# Patient Record
Sex: MALE | Race: WHITE | Employment: FULL TIME | URBAN - METROPOLITAN AREA
[De-identification: names, ages, dates, MRNs, and addresses within clinical notes are randomized per-mention and may not be internally consistent; named-entity substitution may affect disease eponyms.]

---

## 2023-05-07 ENCOUNTER — HOSPITAL ENCOUNTER (EMERGENCY)
Facility: HOSPITAL | Age: 58
Discharge: HOME | End: 2023-05-07
Attending: EMERGENCY MEDICINE
Payer: COMMERCIAL

## 2023-05-07 VITALS
BODY MASS INDEX: 27.77 KG/M2 | TEMPERATURE: 97 F | SYSTOLIC BLOOD PRESSURE: 137 MMHG | HEART RATE: 75 BPM | WEIGHT: 205 LBS | OXYGEN SATURATION: 99 % | RESPIRATION RATE: 9 BRPM | DIASTOLIC BLOOD PRESSURE: 69 MMHG | HEIGHT: 72 IN

## 2023-05-07 DIAGNOSIS — E16.2 HYPOGLYCEMIA: Primary | ICD-10-CM

## 2023-05-07 LAB
ALBUMIN SERPL-MCNC: 3 G/DL (ref 3.4–5)
ALBUMIN/GLOB SERPL: 0.8 {RATIO} (ref 1–2)
ALP LIVER SERPL-CCNC: 190 U/L
ALT SERPL-CCNC: 32 U/L
ANION GAP SERPL CALC-SCNC: 3 MMOL/L (ref 0–18)
AST SERPL-CCNC: 25 U/L (ref 15–37)
BASOPHILS # BLD AUTO: 0.06 X10(3) UL (ref 0–0.2)
BASOPHILS NFR BLD AUTO: 0.8 %
BILIRUB SERPL-MCNC: 0.2 MG/DL (ref 0.1–2)
BUN BLD-MCNC: 9 MG/DL (ref 7–18)
CALCIUM BLD-MCNC: 9 MG/DL (ref 8.5–10.1)
CHLORIDE SERPL-SCNC: 104 MMOL/L (ref 98–112)
CO2 SERPL-SCNC: 28 MMOL/L (ref 21–32)
CREAT BLD-MCNC: 0.82 MG/DL
EOSINOPHIL # BLD AUTO: 0.37 X10(3) UL (ref 0–0.7)
EOSINOPHIL NFR BLD AUTO: 4.8 %
ERYTHROCYTE [DISTWIDTH] IN BLOOD BY AUTOMATED COUNT: 14.1 %
GFR SERPLBLD BASED ON 1.73 SQ M-ARVRAT: 103 ML/MIN/1.73M2 (ref 60–?)
GLOBULIN PLAS-MCNC: 3.9 G/DL (ref 2.8–4.4)
GLUCOSE BLD-MCNC: 151 MG/DL (ref 70–99)
GLUCOSE BLD-MCNC: 192 MG/DL (ref 70–99)
GLUCOSE BLD-MCNC: 242 MG/DL (ref 70–99)
HCT VFR BLD AUTO: 30.7 %
HGB BLD-MCNC: 9.7 G/DL
IMM GRANULOCYTES # BLD AUTO: 0.03 X10(3) UL (ref 0–1)
IMM GRANULOCYTES NFR BLD: 0.4 %
LYMPHOCYTES # BLD AUTO: 1.2 X10(3) UL (ref 1–4)
LYMPHOCYTES NFR BLD AUTO: 15.7 %
MCH RBC QN AUTO: 25 PG (ref 26–34)
MCHC RBC AUTO-ENTMCNC: 31.6 G/DL (ref 31–37)
MCV RBC AUTO: 79.1 FL
MONOCYTES # BLD AUTO: 0.41 X10(3) UL (ref 0.1–1)
MONOCYTES NFR BLD AUTO: 5.4 %
NEUTROPHILS # BLD AUTO: 5.56 X10 (3) UL (ref 1.5–7.7)
NEUTROPHILS # BLD AUTO: 5.56 X10(3) UL (ref 1.5–7.7)
NEUTROPHILS NFR BLD AUTO: 72.9 %
OSMOLALITY SERPL CALC.SUM OF ELEC: 284 MOSM/KG (ref 275–295)
PLATELET # BLD AUTO: 459 10(3)UL (ref 150–450)
POTASSIUM SERPL-SCNC: 3.4 MMOL/L (ref 3.5–5.1)
PROT SERPL-MCNC: 6.9 G/DL (ref 6.4–8.2)
RBC # BLD AUTO: 3.88 X10(6)UL
SODIUM SERPL-SCNC: 135 MMOL/L (ref 136–145)
WBC # BLD AUTO: 7.6 X10(3) UL (ref 4–11)

## 2023-05-07 PROCEDURE — 80053 COMPREHEN METABOLIC PANEL: CPT | Performed by: EMERGENCY MEDICINE

## 2023-05-07 PROCEDURE — 82962 GLUCOSE BLOOD TEST: CPT

## 2023-05-07 PROCEDURE — 99285 EMERGENCY DEPT VISIT HI MDM: CPT

## 2023-05-07 PROCEDURE — 36415 COLL VENOUS BLD VENIPUNCTURE: CPT

## 2023-05-07 PROCEDURE — 99284 EMERGENCY DEPT VISIT MOD MDM: CPT

## 2023-05-07 PROCEDURE — 93005 ELECTROCARDIOGRAM TRACING: CPT

## 2023-05-07 PROCEDURE — 93010 ELECTROCARDIOGRAM REPORT: CPT

## 2023-05-07 PROCEDURE — 85025 COMPLETE CBC W/AUTO DIFF WBC: CPT | Performed by: EMERGENCY MEDICINE

## 2023-05-07 RX ORDER — ONDANSETRON 4 MG/1
4 TABLET, ORALLY DISINTEGRATING ORAL EVERY 8 HOURS PRN
COMMUNITY

## 2023-05-07 RX ORDER — HYDROCODONE BITARTRATE AND ACETAMINOPHEN 5; 325 MG/1; MG/1
0.5 TABLET ORAL EVERY 6 HOURS PRN
COMMUNITY

## 2023-05-07 RX ORDER — ACETAMINOPHEN 325 MG/1
650 TABLET ORAL EVERY 4 HOURS PRN
COMMUNITY

## 2023-05-07 RX ORDER — INSULIN ASPART 100 [IU]/ML
15 INJECTION, SOLUTION INTRAVENOUS; SUBCUTANEOUS
COMMUNITY

## 2023-05-07 RX ORDER — ACETAMINOPHEN 500 MG
1000 TABLET ORAL ONCE
Status: COMPLETED | OUTPATIENT
Start: 2023-05-07 | End: 2023-05-07

## 2023-05-07 RX ORDER — SENNOSIDES 8.6 MG
17.2 TABLET ORAL 2 TIMES DAILY
COMMUNITY

## 2023-05-07 RX ORDER — ALBUTEROL SULFATE 2.5 MG/3ML
2.5 SOLUTION RESPIRATORY (INHALATION) EVERY 4 HOURS PRN
COMMUNITY

## 2023-05-07 RX ORDER — DOCUSATE SODIUM 100 MG/1
100 CAPSULE, LIQUID FILLED ORAL 2 TIMES DAILY
COMMUNITY

## 2023-05-07 RX ORDER — TAMSULOSIN HYDROCHLORIDE 0.4 MG/1
0.4 CAPSULE ORAL DAILY
COMMUNITY

## 2023-05-07 RX ORDER — LIDOCAINE 50 MG/G
1 PATCH TOPICAL EVERY 24 HOURS
COMMUNITY

## 2023-05-07 RX ORDER — ENOXAPARIN SODIUM 100 MG/ML
40 INJECTION SUBCUTANEOUS DAILY
COMMUNITY

## 2023-05-07 RX ORDER — CLOPIDOGREL BISULFATE 75 MG/1
75 TABLET ORAL DAILY
COMMUNITY

## 2023-05-07 RX ORDER — MELATONIN
3 NIGHTLY
COMMUNITY

## 2023-05-07 RX ORDER — MODAFINIL 100 MG/1
100 TABLET ORAL DAILY
COMMUNITY

## 2023-05-07 RX ORDER — CALCIUM CARBONATE 500 MG/1
2 TABLET, CHEWABLE ORAL 3 TIMES DAILY
COMMUNITY

## 2023-05-07 RX ORDER — BISACODYL 10 MG
10 SUPPOSITORY, RECTAL RECTAL DAILY
COMMUNITY

## 2023-05-07 RX ORDER — LEVETIRACETAM 1000 MG/1
1000 TABLET ORAL 2 TIMES DAILY
COMMUNITY

## 2023-05-07 RX ORDER — CYCLOBENZAPRINE HCL 10 MG
10 TABLET ORAL 3 TIMES DAILY PRN
COMMUNITY

## 2023-05-07 RX ORDER — FLUTICASONE PROPIONATE 50 MCG
1 SPRAY, SUSPENSION (ML) NASAL DAILY
COMMUNITY

## 2023-05-07 RX ORDER — GUAIFENESIN 600 MG/1
600 TABLET, EXTENDED RELEASE ORAL 2 TIMES DAILY
COMMUNITY

## 2023-05-07 NOTE — ED INITIAL ASSESSMENT (HPI)
Pt brought to ED by [facility] with c/o low blood sugar. Pt comes from [address] [place] [address], upon their arrival patient's blood glucose was 35 given an amp of glucose, blood sugar increased to 48 patient started on D10 glucose was 148 upon arrived to ED patient's glucose was 151. Pt will open his eyes to painful stimuli at his inner upper arm but then closes his eyes again.

## 2023-05-08 LAB
ATRIAL RATE: 109 BPM
P AXIS: 57 DEGREES
P-R INTERVAL: 148 MS
Q-T INTERVAL: 298 MS
QRS DURATION: 84 MS
QTC CALCULATION (BEZET): 401 MS
R AXIS: -22 DEGREES
T AXIS: 20 DEGREES
VENTRICULAR RATE: 109 BPM

## 2023-05-08 NOTE — ED QUICK NOTES
[place]  [place] ([facility]) sandwich and juice supplied to PT

## 2025-02-22 ENCOUNTER — HOSPITAL ENCOUNTER (INPATIENT)
Facility: HOSPITAL | Age: 60
LOS: 4 days | Discharge: NURSING, REHAB OR LONG-TERM CARE | End: 2025-02-26
Attending: STUDENT IN AN ORGANIZED HEALTH CARE EDUCATION/TRAINING PROGRAM | Admitting: STUDENT IN AN ORGANIZED HEALTH CARE EDUCATION/TRAINING PROGRAM
Payer: MEDICARE

## 2025-02-22 ENCOUNTER — APPOINTMENT (OUTPATIENT)
Dept: CT IMAGING | Facility: HOSPITAL | Age: 60
End: 2025-02-22
Attending: STUDENT IN AN ORGANIZED HEALTH CARE EDUCATION/TRAINING PROGRAM
Payer: MEDICARE

## 2025-02-22 ENCOUNTER — APPOINTMENT (OUTPATIENT)
Dept: GENERAL RADIOLOGY | Facility: HOSPITAL | Age: 60
End: 2025-02-22
Attending: STUDENT IN AN ORGANIZED HEALTH CARE EDUCATION/TRAINING PROGRAM
Payer: MEDICARE

## 2025-02-22 DIAGNOSIS — J69.0 ASPIRATION PNEUMONIA OF LEFT LUNG, UNSPECIFIED ASPIRATION PNEUMONIA TYPE, UNSPECIFIED PART OF LUNG (HCC): ICD-10-CM

## 2025-02-22 DIAGNOSIS — G40.909 SEIZURE DISORDER (HCC): Primary | ICD-10-CM

## 2025-02-22 LAB
ALBUMIN SERPL-MCNC: 3.8 G/DL (ref 3.2–4.8)
ALBUMIN/GLOB SERPL: 1.4 {RATIO} (ref 1–2)
ALP LIVER SERPL-CCNC: 164 U/L
ALT SERPL-CCNC: 29 U/L
ANION GAP SERPL CALC-SCNC: 10 MMOL/L (ref 0–18)
AST SERPL-CCNC: 17 U/L (ref ?–34)
ATRIAL RATE: 98 BPM
BASE EXCESS BLDA CALC-SCNC: 5.7 MMOL/L (ref ?–2)
BASOPHILS # BLD AUTO: 0.16 X10(3) UL (ref 0–0.2)
BASOPHILS NFR BLD AUTO: 1.4 %
BILIRUB SERPL-MCNC: 0.3 MG/DL (ref 0.3–1.2)
BILIRUB UR QL STRIP.AUTO: NEGATIVE
BODY TEMPERATURE: 98.6 F
BUN BLD-MCNC: 10 MG/DL (ref 9–23)
CALCIUM BLD-MCNC: 8.9 MG/DL (ref 8.7–10.6)
CHLORIDE SERPL-SCNC: 101 MMOL/L (ref 98–112)
CLARITY UR REFRACT.AUTO: CLEAR
CO2 SERPL-SCNC: 30 MMOL/L (ref 21–32)
COHGB MFR BLD: 1.4 % SAT (ref 0–3)
COLOR UR AUTO: COLORLESS
CREAT BLD-MCNC: 0.83 MG/DL
EGFRCR SERPLBLD CKD-EPI 2021: 101 ML/MIN/1.73M2 (ref 60–?)
EOSINOPHIL # BLD AUTO: 1.31 X10(3) UL (ref 0–0.7)
EOSINOPHIL NFR BLD AUTO: 11.6 %
ERYTHROCYTE [DISTWIDTH] IN BLOOD BY AUTOMATED COUNT: 13.6 %
GLOBULIN PLAS-MCNC: 2.7 G/DL (ref 2–3.5)
GLUCOSE BLD-MCNC: 103 MG/DL (ref 70–99)
GLUCOSE BLD-MCNC: 138 MG/DL (ref 70–99)
GLUCOSE BLD-MCNC: 208 MG/DL (ref 70–99)
GLUCOSE BLD-MCNC: 244 MG/DL (ref 70–99)
GLUCOSE UR STRIP.AUTO-MCNC: >1000 MG/DL
HCO3 BLDA-SCNC: 29.4 MEQ/L (ref 21–27)
HCT VFR BLD AUTO: 36.1 %
HGB BLD-MCNC: 10.9 G/DL
HGB BLD-MCNC: 11.8 G/DL
IMM GRANULOCYTES # BLD AUTO: 0.03 X10(3) UL (ref 0–1)
IMM GRANULOCYTES NFR BLD: 0.3 %
KETONES UR STRIP.AUTO-MCNC: 20 MG/DL
L/M: 3 L/MIN
LACTATE SERPL-SCNC: 1.6 MMOL/L (ref 0.5–2)
LACTATE SERPL-SCNC: 5 MMOL/L (ref 0.5–2)
LEUKOCYTE ESTERASE UR QL STRIP.AUTO: 250
LYMPHOCYTES # BLD AUTO: 2.24 X10(3) UL (ref 1–4)
LYMPHOCYTES NFR BLD AUTO: 19.8 %
MCH RBC QN AUTO: 27.2 PG (ref 26–34)
MCHC RBC AUTO-ENTMCNC: 32.7 G/DL (ref 31–37)
MCV RBC AUTO: 83.2 FL
METHGB MFR BLD: 0.8 % SAT (ref 0.4–1.5)
MONOCYTES # BLD AUTO: 0.38 X10(3) UL (ref 0.1–1)
MONOCYTES NFR BLD AUTO: 3.4 %
NEUTROPHILS # BLD AUTO: 7.2 X10 (3) UL (ref 1.5–7.7)
NEUTROPHILS # BLD AUTO: 7.2 X10(3) UL (ref 1.5–7.7)
NEUTROPHILS NFR BLD AUTO: 63.5 %
NITRITE UR QL STRIP.AUTO: NEGATIVE
OSMOLALITY SERPL CALC.SUM OF ELEC: 297 MOSM/KG (ref 275–295)
OXYHGB MFR BLDA: 97.4 % (ref 92–100)
P AXIS: 58 DEGREES
P-R INTERVAL: 188 MS
PCO2 BLDA: 48 MM HG (ref 35–45)
PH BLDA: 7.42 [PH] (ref 7.35–7.45)
PH UR STRIP.AUTO: 6 [PH] (ref 5–8)
PLATELET # BLD AUTO: 382 10(3)UL (ref 150–450)
PO2 BLDA: 137 MM HG (ref 80–100)
POTASSIUM SERPL-SCNC: 3 MMOL/L (ref 3.5–5.1)
PROT SERPL-MCNC: 6.5 G/DL (ref 5.7–8.2)
PROT UR STRIP.AUTO-MCNC: 30 MG/DL
Q-T INTERVAL: 378 MS
QRS DURATION: 82 MS
QTC CALCULATION (BEZET): 482 MS
R AXIS: -30 DEGREES
RBC # BLD AUTO: 4.34 X10(6)UL
RBC UR QL AUTO: NEGATIVE
SODIUM SERPL-SCNC: 141 MMOL/L (ref 136–145)
SP GR UR STRIP.AUTO: 1.01 (ref 1–1.03)
T AXIS: 28 DEGREES
UROBILINOGEN UR STRIP.AUTO-MCNC: NORMAL MG/DL
VENTRICULAR RATE: 98 BPM
WBC # BLD AUTO: 11.3 X10(3) UL (ref 4–11)

## 2025-02-22 PROCEDURE — 71045 X-RAY EXAM CHEST 1 VIEW: CPT | Performed by: STUDENT IN AN ORGANIZED HEALTH CARE EDUCATION/TRAINING PROGRAM

## 2025-02-22 PROCEDURE — 70450 CT HEAD/BRAIN W/O DYE: CPT | Performed by: STUDENT IN AN ORGANIZED HEALTH CARE EDUCATION/TRAINING PROGRAM

## 2025-02-22 RX ORDER — DEXTROSE MONOHYDRATE 25 G/50ML
50 INJECTION, SOLUTION INTRAVENOUS
Status: DISCONTINUED | OUTPATIENT
Start: 2025-02-22 | End: 2025-02-26

## 2025-02-22 RX ORDER — LORAZEPAM 2 MG/ML
0.5 CONCENTRATE ORAL EVERY 2 HOUR PRN
COMMUNITY

## 2025-02-22 RX ORDER — ACETAMINOPHEN 500 MG
500 TABLET ORAL EVERY 4 HOURS PRN
Status: DISCONTINUED | OUTPATIENT
Start: 2025-02-22 | End: 2025-02-23

## 2025-02-22 RX ORDER — LEVETIRACETAM 500 MG/5ML
1000 INJECTION, SOLUTION, CONCENTRATE INTRAVENOUS EVERY 12 HOURS
Status: DISCONTINUED | OUTPATIENT
Start: 2025-02-22 | End: 2025-02-24

## 2025-02-22 RX ORDER — ONDANSETRON 2 MG/ML
4 INJECTION INTRAMUSCULAR; INTRAVENOUS EVERY 6 HOURS PRN
Status: DISCONTINUED | OUTPATIENT
Start: 2025-02-22 | End: 2025-02-26

## 2025-02-22 RX ORDER — METRONIDAZOLE 500 MG/100ML
500 INJECTION, SOLUTION INTRAVENOUS ONCE
Status: COMPLETED | OUTPATIENT
Start: 2025-02-22 | End: 2025-02-22

## 2025-02-22 RX ORDER — LORAZEPAM 2 MG/ML
1 INJECTION INTRAMUSCULAR EVERY 6 HOURS PRN
Status: DISCONTINUED | OUTPATIENT
Start: 2025-02-22 | End: 2025-02-26

## 2025-02-22 RX ORDER — METRONIDAZOLE 500 MG/100ML
500 INJECTION, SOLUTION INTRAVENOUS EVERY 12 HOURS
Status: DISCONTINUED | OUTPATIENT
Start: 2025-02-23 | End: 2025-02-23

## 2025-02-22 RX ORDER — SODIUM PHOSPHATE, DIBASIC AND SODIUM PHOSPHATE, MONOBASIC 7; 19 G/230ML; G/230ML
1 ENEMA RECTAL ONCE AS NEEDED
Status: DISCONTINUED | OUTPATIENT
Start: 2025-02-22 | End: 2025-02-26

## 2025-02-22 RX ORDER — COLLAGENASE SANTYL 250 [ARB'U]/G
OINTMENT TOPICAL DAILY
COMMUNITY

## 2025-02-22 RX ORDER — LACOSAMIDE 100 MG/1
100 TABLET ORAL EVERY 12 HOURS
COMMUNITY
End: 2025-02-26

## 2025-02-22 RX ORDER — LORAZEPAM 2 MG/ML
2 INJECTION INTRAMUSCULAR ONCE
Status: COMPLETED | OUTPATIENT
Start: 2025-02-22 | End: 2025-02-22

## 2025-02-22 RX ORDER — LACOSAMIDE 100 MG/1
100 TABLET ORAL EVERY 12 HOURS
Status: DISCONTINUED | OUTPATIENT
Start: 2025-02-22 | End: 2025-02-24

## 2025-02-22 RX ORDER — ENOXAPARIN SODIUM 100 MG/ML
40 INJECTION SUBCUTANEOUS DAILY
Status: DISCONTINUED | OUTPATIENT
Start: 2025-02-22 | End: 2025-02-26

## 2025-02-22 RX ORDER — NICOTINE POLACRILEX 4 MG
LOZENGE BUCCAL
COMMUNITY

## 2025-02-22 RX ORDER — SODIUM CHLORIDE 9 MG/ML
INJECTION, SOLUTION INTRAVENOUS CONTINUOUS
Status: DISCONTINUED | OUTPATIENT
Start: 2025-02-22 | End: 2025-02-23

## 2025-02-22 RX ORDER — NICOTINE POLACRILEX 4 MG
30 LOZENGE BUCCAL
Status: DISCONTINUED | OUTPATIENT
Start: 2025-02-22 | End: 2025-02-26

## 2025-02-22 RX ORDER — POLYETHYLENE GLYCOL 3350 17 G/17G
17 POWDER, FOR SOLUTION ORAL DAILY PRN
Status: DISCONTINUED | OUTPATIENT
Start: 2025-02-22 | End: 2025-02-26

## 2025-02-22 RX ORDER — OMEPRAZOLE 20 MG/1
20 CAPSULE, DELAYED RELEASE ORAL EVERY MORNING
COMMUNITY

## 2025-02-22 RX ORDER — PROCHLORPERAZINE EDISYLATE 5 MG/ML
5 INJECTION INTRAMUSCULAR; INTRAVENOUS EVERY 8 HOURS PRN
Status: DISCONTINUED | OUTPATIENT
Start: 2025-02-22 | End: 2025-02-26

## 2025-02-22 RX ORDER — NICOTINE POLACRILEX 4 MG
15 LOZENGE BUCCAL
Status: DISCONTINUED | OUTPATIENT
Start: 2025-02-22 | End: 2025-02-26

## 2025-02-22 RX ORDER — INSULIN DEGLUDEC 100 U/ML
12 INJECTION, SOLUTION SUBCUTANEOUS DAILY
Status: DISCONTINUED | OUTPATIENT
Start: 2025-02-23 | End: 2025-02-26

## 2025-02-22 RX ORDER — SENNOSIDES 8.6 MG
17.2 TABLET ORAL NIGHTLY PRN
Status: DISCONTINUED | OUTPATIENT
Start: 2025-02-22 | End: 2025-02-26

## 2025-02-22 RX ORDER — GLUCAGON INJECTION, SOLUTION 1 MG/.2ML
1 INJECTION, SOLUTION SUBCUTANEOUS ONCE AS NEEDED
COMMUNITY

## 2025-02-22 RX ORDER — LORAZEPAM 2 MG/ML
2 CONCENTRATE ORAL
COMMUNITY

## 2025-02-22 RX ORDER — LEVETIRACETAM 500 MG/5ML
1000 INJECTION, SOLUTION, CONCENTRATE INTRAVENOUS ONCE
Status: COMPLETED | OUTPATIENT
Start: 2025-02-22 | End: 2025-02-22

## 2025-02-22 RX ORDER — INSULIN GLARGINE 100 [IU]/ML
15 INJECTION, SOLUTION SUBCUTANEOUS DAILY
COMMUNITY
End: 2025-02-26

## 2025-02-22 RX ORDER — BISACODYL 10 MG
10 SUPPOSITORY, RECTAL RECTAL
Status: DISCONTINUED | OUTPATIENT
Start: 2025-02-22 | End: 2025-02-26

## 2025-02-22 RX ORDER — POTASSIUM CHLORIDE 1500 MG/1
40 TABLET, EXTENDED RELEASE ORAL ONCE
Status: COMPLETED | OUTPATIENT
Start: 2025-02-22 | End: 2025-02-22

## 2025-02-22 NOTE — ED PROVIDER NOTES
Patient Seen in: [facility] Emergency Department      History     Chief Complaint   Patient presents with    Seizure Disorder     Stated Complaint: actively seizing while coming in to ER    Subjective:   HPI  Patient is a 59-year-old male presented to the emergency room actively seizing from nursing home.  Patient reportedly may have had a seizure this morning and was found by new staff members stating that he did not appear to be himself.  EMS tells me when they arrived patient appeared postictal to them.  As they are wheeling the patient into his ER room they noticed he was having active seizure therefore they pushed 2 mg of IV Versed.    Per the patient's nursing home chart review it appears he has a history of a stroke with residual left-sided weakness, he is a type I diabetic and he also appears to be on 500 mg of Keppra twice a day.        Objective:     Past Medical History:    Anemia    Cancer (HCC)    Cardiomyopathy (HCC)    Congestive heart disease (HCC)    Esophageal reflux    Essential hypertension    Hyperlipidemia    Neuropathy    Renal disorder    Type I (juvenile type) diabetes mellitus without mention of complication, not stated as uncontrolled              Past Surgical History:   Procedure Laterality Date    Below knee leg amputation Right     Cataract      Other surgical history      tendon 2006    Other surgical history      (L) hallux amputation 2006    Other surgical history                  Social History     Socioeconomic History    Marital status: Single   Tobacco Use    Smoking status: Never   Vaping Use    Vaping status: Never Used   Substance and Sexual Activity    Alcohol use: Yes     Comment: social    Drug use: Not Currently     Social Drivers of Health     Food Insecurity: No Food Insecurity (2/22/2025)    NCSS - Food Insecurity     Worried About Running Out of Food in the Last Year: No     Ran Out of Food in the Last Year: No   Transportation Needs: No Transportation Needs  (2/22/2025)    NCSS - Transportation     Lack of Transportation: No   Housing Stability: Not At Risk (2/22/2025)    NCSS - Housing/Utilities     Has Housing: Yes     Worried About Losing Housing: No     Unable to Get Utilities: No                  Physical Exam     ED Triage Vitals   BP 02/22/25 1032 115/69   Pulse 02/22/25 1032 108   Resp 02/22/25 1032 (!) 32   Temp 02/22/25 1452 97.7 °F (36.5 °C)   Temp src 02/22/25 1452 Oral   SpO2 02/22/25 1032 93 %   O2 Device 02/22/25 1032 Nasal cannula       Current Vitals:   Vital Signs  BP: 129/60  Pulse: (!) 36  Resp: 12  Temp: 98.4 °F (36.9 °C)  Temp src: Oral  MAP (mmHg): 77    Oxygen Therapy  SpO2: (!) 84 %  O2 Device: None (Room air)  O2 Flow Rate (L/min): 3 L/min  Pulse Oximetry Type: Intermittent  Oximetry Probe Site Changed: No        Physical Exam  Vitals and nursing note reviewed.   Constitutional:       Comments: Eyes closed, when open they are deviated to the right   HENT:      Right Ear: Tympanic membrane normal.      Left Ear: Tympanic membrane normal.      Nose: Nose normal.   Eyes:      Pupils: Pupils are equal, round, and reactive to light.      Comments: Eyes shut when eyelids pulled back eyes are deviated to the right   Cardiovascular:      Rate and Rhythm: Normal rate and regular rhythm.      Pulses: Normal pulses.      Heart sounds: Normal heart sounds.   Pulmonary:      Effort: Pulmonary effort is normal.      Comments: Diminished  Musculoskeletal:      Comments: Left upper and lower extremity weakness chronic   Right BKA   Neurological:      Mental Status: He is disoriented.      Motor: Weakness present.             ED Course     Labs Reviewed   CBC WITH DIFFERENTIAL WITH PLATELET - Abnormal; Notable for the following components:       Result Value    WBC 11.3 (*)     HGB 11.8 (*)     HCT 36.1 (*)     Eosinophil Absolute 1.31 (*)     All other components within normal limits   COMP METABOLIC PANEL (14) - Abnormal; Notable for the following components:     Glucose 208 (*)     Potassium 3.0 (*)     Calculated Osmolality 297 (*)     Alkaline Phosphatase 164 (*)     All other components within normal limits   URINALYSIS WITH CULTURE REFLEX - Abnormal; Notable for the following components:    Urine Color Colorless (*)     Glucose Urine >1000 (*)     Ketones Urine 20 (*)     Protein Urine 30 (*)     Leukocyte Esterase Urine 250 (*)     WBC Urine 11-20 (*)     Bacteria Urine Rare (*)     All other components within normal limits   LACTIC ACID, PLASMA - Abnormal; Notable for the following components:    Lactic Acid 5.0 (*)     All other components within normal limits   ARTERIAL BLOOD GAS - Abnormal; Notable for the following components:    ABG pCO2 48 (*)     ABG pO2 137 (*)     ABG HCO3 29.4 (*)     ABG Base Excess 5.7 (*)     Total Hemoglobin 10.9 (*)     All other components within normal limits   COMP METABOLIC PANEL (14) - Abnormal; Notable for the following components:    Glucose 129 (*)     Sodium 146 (*)     BUN <5 (*)     Creatinine 0.66 (*)     Alkaline Phosphatase 148 (*)     All other components within normal limits   CBC WITH DIFFERENTIAL WITH PLATELET - Abnormal; Notable for the following components:    WBC 12.1 (*)     RBC 3.90 (*)     HGB 10.7 (*)     HCT 33.2 (*)     Neutrophil Absolute Prelim 7.89 (*)     Neutrophil Absolute 7.89 (*)     Eosinophil Absolute 1.36 (*)     All other components within normal limits   POCT GLUCOSE - Abnormal; Notable for the following components:    POC Glucose 244 (*)     All other components within normal limits   POCT GLUCOSE - Abnormal; Notable for the following components:    POC Glucose 103 (*)     All other components within normal limits   POCT GLUCOSE - Abnormal; Notable for the following components:    POC Glucose 138 (*)     All other components within normal limits   POCT GLUCOSE - Abnormal; Notable for the following components:    POC Glucose 235 (*)     All other components within normal limits   POCT GLUCOSE -  Abnormal; Notable for the following components:    POC Glucose 154 (*)     All other components within normal limits   URINE CULTURE, ROUTINE - Abnormal; Notable for the following components:    Urine Culture 10,000 - 50,000 CFU/ML Pseudomonas aeruginosa (*)     All other components within normal limits   LACTIC ACID REFLEX POST POSTIVE - Normal   SCAN SLIDE   SCAN SLIDE   RAINBOW DRAW BLUE   BLOOD CULTURE   BLOOD CULTURE     EKG    Rate, intervals and axes as noted on EKG Report.  Rate: 98  Rhythm: Sinus Rhythm  Reading:  nonspecific ST changes           CT BRAIN OR HEAD (CPT=70450)    Result Date: 2/22/2025  CONCLUSION:  1. No acute intracranial hemorrhage. 2. Ventricular enlargement is out of proportion to cortical enlargement suggesting normal pressure hydrocephalus, correlate clinically. 3. Bilateral low-attenuation within the white matter suggests encephalomalacia from prior insults.  A superimposed acute infarct cannot be entirely excluded.  An MRI of the brain can be performed for further evaluation as clinically indicated. 4. If patient has prior studies of the brain, these would be helpful for further evaluation and to assess stability of the above findings.    LOCATION:  [facility]   Dictated by (CST): Gretchen Horn MD on 2/22/2025 at 11:46 AM     Finalized by (CST): Gretchen Horn MD on 2/22/2025 at 11:50 AM       XR CHEST AP PORTABLE  (CPT=71045)    Result Date: 2/22/2025  CONCLUSION:  Minimal left basilar opacity may be due to a developing consolidation or aspiration.   LOCATION:  [facility]      Dictated by (CST): Juan Jorgensen MD on 2/22/2025 at 11:46 AM     Finalized by (CST): Juan Jorgensen MD on 2/22/2025 at 11:47 AM                   [facility]        Medical Decision Making  The differential includes the following  Status epilepticus, infectious etiology leading to sepsis sepsis, aspiration pneumonia, metabolic derangement    Pertinent comorbidities include  As detailed above    Pertinent social history  includes  As detailed above    Labs  Lactate is 5, wbc 11,   Potassium 3.0     /    Imaging studies  I reviewed the images and my independent interpreation after review is no floored pulm edema. Additionaly, I reviewd the radiology report that states the following left lower opacity    External data reviewed  Prior labs     Discussion of management with external providers  Gen neuro Dr. Selby who recommends CNICU admission  Neuro ICU Dr. Barba recommends loading with two additional grams of 2 grams of keppra and floor as pt is returning back to baseline    ER course  On arrival to the ER patient was actively apparently seizing when EMS brought him and therefore he was given 2 of IV Versed.  On my initial exam he has his eyes closed but when open by myself I see's are still deviated towards the left and patient is responsive therefore I find him with additional 2 mg of IV Ativan and a gram of Keppra to load him.  Patient underwent CT head scan rule out intracranial hemorrhage or other acute pathology which is ultimately negative.      Patient's lactic elevated at 5 which could be related to recent seizures but also potentially as a result of sepsis.  Given additional concern for potential aspiration pneumonia blood cultures obtained and patient treated with empiric antibiotics to cover for sepsis.  There was a delay in antibiotic administration as patient has allergies and there is no collateral information.  Our ER pharmacist was able to provide recommendations on appropriate antibiotics.     Patient became more awake and alert and responsive.  His brother and sister-in-law arrived.  They provide us with additional information stating he had had 2 seizures while at the nursing home.  They also informed us that the patient is actually blind.  Lastly they said that he is a DNR patient.  Given patient's return to baseline he was admitted to the floor and given additional Keppra as advised by neurointensivist.  He  had no further seizures while in the ER.    A total of 35 minutes of critical care time (exclusive of billable procedures) was administered to manage the patient's critical lab values and neurologic instability due to his concerns for status epilepticus.  This involved direct patient intervention, complex decision making, and/or extensive discussions with the patient, family, and clinical staff.      Disposition and Plan     Clinical Impression:  1. Seizure disorder (HCC)    2. Aspiration pneumonia of left lung, unspecified aspiration pneumonia type, unspecified part of lung (HCC)         Disposition:  Admit  2/22/2025  1:14 pm    Follow-up:  No follow-up provider specified.        Medications Prescribed:  Current Discharge Medication List              Supplementary Documentation:     [facility]   part of [facility]      Sepsis Reassessment Note    /60 (BP Location: Left arm)   Pulse (!) 36   Temp 98.4 °F (36.9 °C) (Oral)   Resp 12   Ht 182.9 cm (6')   Wt 93 kg   SpO2 (!) 84%   BMI 27.81 kg/m²      I completed the sepsis reassessment at 1:15 pm    Cardiac:  Regularity: Regular  Rate: Normal  Heart Sounds: S1,S2    Lungs:   Right: Clear  Left: Diminished    Peripheral Pulses:  Radial: Right 2+ or Left 2+      Capillary Refill:  <3 Secs    Skin:  Temp/Moisture: Warm and Dry  Color: Normal      Chiqui Francisco MD  2/22/2025  3:12 PM       Hospital Problems       Present on Admission  Date Reviewed: 6/30/2014            ICD-10-CM Noted POA    * (Principal) Seizure disorder (HCC) G40.909 2/22/2025 Unknown    Aspiration pneumonia of left lung, unspecified aspiration pneumonia type, unspecified part of lung (HCC) J69.0 2/22/2025 Unknown

## 2025-02-22 NOTE — ED QUICK NOTES
RN to CT and back with patient. Pt remained stable, coughing. Prior to going to CT, RN straight cath patient for urine. Pt diaper saturated. Approx 400cc urine output in bag. RN contacted Rebeca in the lab requesting phlebotomy draw for blood cultures.

## 2025-02-22 NOTE — ED QUICK NOTES
Orders for admission, patient is aware of plan and ready to go upstairs. Any questions, please call ED DAVID Moss at extension [phone].     Patient Covid vaccination status: Unvaccinated     COVID Test Ordered in ED: None    COVID Suspicion at Admission: N/A    Running Infusions:    sodium chloride 2,790 mL (02/22/25 1045)        Mental Status/LOC at time of transport: A&Ox3    Other pertinent information:   CIWA score: N/A   NIH score:  N/A

## 2025-02-22 NOTE — PLAN OF CARE
NURSING ADMISSION NOTE      Patient admitted via Cart  Oriented to room.  Safety precautions initiated.  Bed in low position.  Call light in reach.  Admission assessment completed with the patient to the best of his understanding. Patient is A & o x2-3, answers most questions appropriately and follows commands. Incontinent with primofit in place, bedbound at baseline.

## 2025-02-22 NOTE — ED QUICK NOTES
Pt soiled the bed. Pt cleaned and placed in a new brief. This RN and ED PCT changed the sheets on the bed.

## 2025-02-22 NOTE — ED QUICK NOTES
Nataliya doing Med rec on patient. Meds listed in Epic at this time are not accurate. Pt is normally seen at [facility].

## 2025-02-22 NOTE — ED QUICK NOTES
Spoke with RN at nursing home who states pt is normally AAOX3-4. Pt can usually communicate with staff. Pt is not ambulatory, pt is blind. Pt is a DNR, unable to find documentation.

## 2025-02-22 NOTE — H&P
[facility] Hospitalist H&P       CC:   Chief Complaint   Patient presents with    Seizure Disorder        PCP: Julianne Zambrano MD    History of Present Illness: Patient is a 59 year old male with PMH sig for hypertension, hyperlipidemia, CHF, type 1 diabetes, status post right BKA history of seizures who was brought from nursing home for evaluation of seizures.  Patient had 2 seizures at the nursing home and was confused, when he was brought to the hospital, he had another seizure in the ER, Versed was given, patient was somewhat confused.  He was unable to provide any meaningful history on presentation when I evaluated him in the ER.  In the ER, vital signs significant for heart rate 102, respirations 32, blood pressure 115/69, satting 87% on 3 L of nasal cannula.  Patient's lactic acid was noted to be 5 labs significant for mild leukocytosis.,  Hypokalemia.  UA was abnormal.  Chest x-ray obtained.  He is admitted for further workup and management.      PMH  Past Medical History:    Anemia    Cancer (HCC)    Cardiomyopathy (HCC)    Congestive heart disease (HCC)    Esophageal reflux    Essential hypertension    Hyperlipidemia    Neuropathy    Renal disorder    Type I (juvenile type) diabetes mellitus without mention of complication, not stated as uncontrolled        PSH  Past Surgical History:   Procedure Laterality Date    Below knee leg amputation Right     Cataract      Other surgical history      tendon 2006    Other surgical history      (L) hallux amputation 2006    Other surgical history          ALL:  Allergies[1]     Home Medications:  Medications Taking[2]      Soc Hx  Social History     Tobacco Use    Smoking status: Never    Smokeless tobacco: Not on file   Substance Use Topics    Alcohol use: Yes     Comment: social        Fam Hx  Family History   Problem Relation Age of Onset    Cancer Neg     Diabetes Neg     Heart Disorder Neg     Hypertension Neg     Lipids Neg        Review of Systems  Comprehensive  ROS reviewed and negative except for what's stated above.     OBJECTIVE:  /84   Pulse 101   Temp 97.7 °F (36.5 °C) (Oral)   Resp 19   Wt 205 lb 0.4 oz (93 kg)   SpO2 100%   BMI 27.81 kg/m²   General:  Drowsy, confused, restless   Head:  Normocephalic, without obvious abnormality, atraumatic.   Eyes:  Sclera anicteric, No conjunctival pallor, EOMs intact.    Nose: Nares normal. Septum midline. Mucosa normal. No drainage.       Neck: Supple, symmetrical, trachea midline,   Lungs:   Clear to auscultation bilaterally. Normal effort   Chest wall:  No tenderness or deformity.   Heart:  Regular rate and rhythm, S1, S2 normal,    Abdomen:   Soft, non-tender. Bowel sounds normal.   Non distended   Extremities: R BKA,    Skin: Skin color, texture, turgor normal. No rashes or lesions.    Neurologic: Confused, restless, unable to participate in exam     Diagnostic Data:    CBC/Chem  Recent Labs   Lab 02/22/25  1104   WBC 11.3*   HGB 11.8*   MCV 83.2   .0       Recent Labs   Lab 02/22/25  1104      K 3.0*      CO2 30.0   BUN 10   CREATSERUM 0.83   *   CA 8.9       Recent Labs   Lab 02/22/25  1104   ALT 29   AST 17   ALB 3.8       No results for input(s): \"TROP\" in the last 168 hours.    CXR: image personally reviewed     Radiology: CT BRAIN OR HEAD (CPT=70450)    Result Date: 2/22/2025  CONCLUSION:  1. No acute intracranial hemorrhage. 2. Ventricular enlargement is out of proportion to cortical enlargement suggesting normal pressure hydrocephalus, correlate clinically. 3. Bilateral low-attenuation within the white matter suggests encephalomalacia from prior insults.  A superimposed acute infarct cannot be entirely excluded.  An MRI of the brain can be performed for further evaluation as clinically indicated. 4. If patient has prior studies of the brain, these would be helpful for further evaluation and to assess stability of the above findings.    LOCATION:  [facility]   Dictated by (CST): Justina  MD Gretchen on 2/22/2025 at 11:46 AM     Finalized by (CST): Gretchen Horn MD on 2/22/2025 at 11:50 AM       XR CHEST AP PORTABLE  (CPT=71045)    Result Date: 2/22/2025  CONCLUSION:  Minimal left basilar opacity may be due to a developing consolidation or aspiration.   LOCATION:  [facility]      Dictated by (CST): Juan Jorgensen MD on 2/22/2025 at 11:46 AM     Finalized by (CST): Juan Jorgensen MD on 2/22/2025 at 11:47 AM          ASSESSMENT / PLAN:     Patient is a 59 year old male with PMH sig for hypertension, hyperlipidemia, CHF, type 1 diabetes, status post right BKA history of seizures who was brought from nursing home for evaluation of seizures.     Breakthrough seizures  History of seizure disorder  Rule out infection  - Loaded with Keppra, continue Keppra 1000 mg twice daily  - Continue home lacosamide  -Add as needed Ativan for breakthrough seizures  - Telemetry  - Neurology consult, appreciate recommendations  -Treat underlying infection if any    Leukocytosis  Abnormal UA  Possible aspiration pneumonitis in the setting of seizures   lactic acidosis  -Elevated lactate of 5 in the setting of seizures, repeat normal  -Blood cultures x 2 pending  - UA grossly abnormal, await cultures  - Empirically covered with IV ceftriaxone and IV Flagyl    Type 1 diabetes with hyperglycemia  - Uses pump at home however pump is not present  - Basal bolus regimen, Accu-Cheks, ISS  - Will start with 12 units Tresiba daily with 4 units NovoLog 3 times daily with meals-adjust as needed  - Diabetes educator consult    FN:  - IVF: 0.9 NS  - Diet: Diabetic    DVT Prophy: SCDs, Lovenox  Atrophy: PT/OT  Lines: PIV    Dispo: admit    Outpatient records or previous hospital records reviewed.     Further recommendations pending patient's clinical course.  [facility] hospitalist to continue to follow patient while in house    Patient and/or patient's family given opportunity to ask questions and note understanding and agreeing with therapeutic  plan as outlined    Thank You,  DO Amy Orosco Hospitalist  Answering Service number: [phone]         [1]   Allergies  Allergen Reactions    Morphine UNKNOWN    Other UNKNOWN     Opioids listed on radha face sheet    Piperacillin UNKNOWN    Zosyn UNKNOWN   [2]   Outpatient Medications Marked as Taking for the 2/22/25 encounter (Hospital Encounter)   Medication Sig Dispense Refill    glucose (GLUTOSE 15) 40% Oral Gel Give 1 kit by mouth every 1 hours as needed for hypoglycemia      glucagon (GVOKE HYPOPEN 1-PACK) 1 MG/0.2ML Subcutaneous injection Inject 0.2 mL (1 mg total) into the skin once as needed for Low blood glucose.      insulin glargine (LANTUS SOLOSTAR) 100 UNIT/ML Subcutaneous Solution Pen-injector Inject 15 Units into the skin daily.      insulin aspart 100 Units/mL Subcutaneous Solution Pen-injector Inject 8 Units into the skin in the morning, at noon, and at bedtime.      insulin aspart 100 Units/mL Subcutaneous Solution Pen-injector Inject as per sliding scale: if  150-200 = 2 units  201-250 = 4 units  251-300 = 6 units  301- 350 = 8 units  Subcutaneously before meals and at bedtime for diabetes greater than 350 give 10 unites and call provider      omeprazole 20 MG Oral Capsule Delayed Release Take 1 capsule (20 mg total) by mouth every morning.      Silver (OPTIFOAM GENTLE AG DRESSING EX) Apply topically. Apply to left heel topically as needed for skin condition. Cleanse area with NS, pat dry, apply santyl and cover with foam dressing.      collagenase (SANTYL) 250 UNIT/GM External Ointment Apply topically daily. Apply to left heel topically as needed for skin condition. Cleanse area with NS, pat dry, apply santyl and cover with foam dressing.      lacosamide (VIMPAT) 100 MG Oral Tab Take 1 tablet (100 mg total) by mouth every 12 (twelve) hours.      acetaminophen 500 MG Oral Tab Take 2 tablets (1,000 mg total) by mouth in the morning, at noon, and at bedtime. For pain       levetiracetam 1000 MG Oral Tab Take 1 tablet (1,000 mg total) by mouth every 12 (twelve) hours.

## 2025-02-22 NOTE — ED INITIAL ASSESSMENT (HPI)
Pt to ED for lethargy after seizure. Pt does not have hx seizures. Upon entering ER, pt began seizing. Medics gave 2mg Versed. Dr. Francisco at bedside. Pt continues to seize.

## 2025-02-23 LAB
ALBUMIN SERPL-MCNC: 3.6 G/DL (ref 3.2–4.8)
ALBUMIN/GLOB SERPL: 1.6 {RATIO} (ref 1–2)
ALP LIVER SERPL-CCNC: 148 U/L
ALT SERPL-CCNC: 18 U/L
ANION GAP SERPL CALC-SCNC: 7 MMOL/L (ref 0–18)
AST SERPL-CCNC: 13 U/L (ref ?–34)
BASOPHILS # BLD AUTO: 0.14 X10(3) UL (ref 0–0.2)
BASOPHILS NFR BLD AUTO: 1.2 %
BILIRUB SERPL-MCNC: 0.3 MG/DL (ref 0.3–1.2)
BUN BLD-MCNC: <5 MG/DL (ref 9–23)
CALCIUM BLD-MCNC: 8.8 MG/DL (ref 8.7–10.6)
CHLORIDE SERPL-SCNC: 109 MMOL/L (ref 98–112)
CO2 SERPL-SCNC: 30 MMOL/L (ref 21–32)
CREAT BLD-MCNC: 0.66 MG/DL
EGFRCR SERPLBLD CKD-EPI 2021: 109 ML/MIN/1.73M2 (ref 60–?)
EOSINOPHIL # BLD AUTO: 1.36 X10(3) UL (ref 0–0.7)
EOSINOPHIL NFR BLD AUTO: 11.2 %
ERYTHROCYTE [DISTWIDTH] IN BLOOD BY AUTOMATED COUNT: 13.7 %
GLOBULIN PLAS-MCNC: 2.2 G/DL (ref 2–3.5)
GLUCOSE BLD-MCNC: 129 MG/DL (ref 70–99)
GLUCOSE BLD-MCNC: 154 MG/DL (ref 70–99)
GLUCOSE BLD-MCNC: 231 MG/DL (ref 70–99)
GLUCOSE BLD-MCNC: 235 MG/DL (ref 70–99)
GLUCOSE BLD-MCNC: 81 MG/DL (ref 70–99)
HCT VFR BLD AUTO: 33.2 %
HGB BLD-MCNC: 10.7 G/DL
IMM GRANULOCYTES # BLD AUTO: 0.04 X10(3) UL (ref 0–1)
IMM GRANULOCYTES NFR BLD: 0.3 %
LYMPHOCYTES # BLD AUTO: 2.16 X10(3) UL (ref 1–4)
LYMPHOCYTES NFR BLD AUTO: 17.8 %
MCH RBC QN AUTO: 27.4 PG (ref 26–34)
MCHC RBC AUTO-ENTMCNC: 32.2 G/DL (ref 31–37)
MCV RBC AUTO: 85.1 FL
MONOCYTES # BLD AUTO: 0.54 X10(3) UL (ref 0.1–1)
MONOCYTES NFR BLD AUTO: 4.5 %
NEUTROPHILS # BLD AUTO: 7.89 X10 (3) UL (ref 1.5–7.7)
NEUTROPHILS # BLD AUTO: 7.89 X10(3) UL (ref 1.5–7.7)
NEUTROPHILS NFR BLD AUTO: 65 %
PLATELET # BLD AUTO: 255 10(3)UL (ref 150–450)
POTASSIUM SERPL-SCNC: 3.6 MMOL/L (ref 3.5–5.1)
PROT SERPL-MCNC: 5.8 G/DL (ref 5.7–8.2)
RBC # BLD AUTO: 3.9 X10(6)UL
SODIUM SERPL-SCNC: 146 MMOL/L (ref 136–145)
WBC # BLD AUTO: 12.1 X10(3) UL (ref 4–11)

## 2025-02-23 PROCEDURE — 95720 EEG PHY/QHP EA INCR W/VEEG: CPT | Performed by: OTHER

## 2025-02-23 PROCEDURE — 99223 1ST HOSP IP/OBS HIGH 75: CPT | Performed by: OTHER

## 2025-02-23 RX ORDER — ACETAMINOPHEN 500 MG
500 TABLET ORAL EVERY 4 HOURS PRN
Status: DISCONTINUED | OUTPATIENT
Start: 2025-02-23 | End: 2025-02-26

## 2025-02-23 NOTE — PLAN OF CARE
Patient A&O x 2. Confusion. Occasional, occasional agitation. Room air. NSR on tele.   Lovenox. Seizure precautions in place. Electrolyte protocol. 1:1 feed. Incontinent x 2. Primo  Carb controlled diet. QID accu check. PIV to the right upper arm and right hand   0.9 at 100 to the right upper arm. IV abx. Not ambulatory.   Consults: Hosp/Neuro   Plan of care updated. Continuously rounded on         Problem: Patient/Family Goals  Goal: Patient/Family Long Term Goal  Description: Patient's Long Term Goal: Discharge     Interventions:  - Follow plan of care   - See additional Care Plan goals for specific interventions  Outcome: Progressing  Goal: Patient/Family Short Term Goal  Description: Patient's Short Term Goal:   2/22 NOC: Control seizure     Interventions:   - Anti convulsant meds, seizure precautions in place   - See additional Care Plan goals for specific interventions  Outcome: Progressing

## 2025-02-23 NOTE — CONSULTS
Infectious Disease Initial Consultation      Date of admission: 2/22/2025 10:29 AM     Date of service: 02/23/25 3:18 PM    Consult requested by: Adi Dawson DO    Reason for consult: Pseudomonal UTI    Chief complaint: Pseudomonal UTI    History of present illness: Janes Heart is a 59 year old male with history of hypertension, hyperlipidemia, CAD, congestive heart failure, who presented to the hospital with multiple seizures and postictal confusion.    Following the emergency room, he had another seizure and was given Versed at that point.  Patient has a history of stroke with residual left-sided hemiparesis.    In the emergency room, the patient was afebrile, hemodynamically stable.  Labs revealed unremarkable BMP.  Alk phos was slightly elevated at 164.  CBC showed mild leukocytosis white count of 11.3 with eosinophilia.  UA showed pyuria with 11-20 white blood cells.  Urine culture currently growing Pseudomonas aeruginosa.  2 sets of blood cultures obtained, remain negative to date.  CT of the brain did not show any acute changes or findings.  Normal pressure hydrocephalus noted.  Bilateral low-attenuation within the minor suggesting encephalomalacia.  MRI of the brain was ordered and is currently pending.  Case discussed with ID and the patient was switched from ceftriaxone and Flagyl to cefepime given Pseudomonas in his urine.    Review of systems:  All other components of the review of systems are negative, except those described in the history of present illness.     Past Medical History:    Anemia    Cancer (HCC)    Cardiomyopathy (HCC)    Congestive heart disease (HCC)    Esophageal reflux    Essential hypertension    Hyperlipidemia    Neuropathy    Renal disorder    Type I (juvenile type) diabetes mellitus without mention of complication, not stated as uncontrolled     Past Surgical History:   Procedure Laterality Date    Below knee leg amputation Right     Cataract      Other surgical  history      tendon 2006    Other surgical history      (L) hallux amputation 2006    Other surgical history       Social History     Socioeconomic History    Marital status: Single   Tobacco Use    Smoking status: Never   Vaping Use    Vaping status: Never Used   Substance and Sexual Activity    Alcohol use: Yes     Comment: social    Drug use: Not Currently     Social Drivers of Health     Food Insecurity: No Food Insecurity (2/22/2025)    NCSS - Food Insecurity     Worried About Running Out of Food in the Last Year: No     Ran Out of Food in the Last Year: No   Transportation Needs: No Transportation Needs (2/22/2025)    NCSS - Transportation     Lack of Transportation: No   Housing Stability: Not At Risk (2/22/2025)    NCSS - Housing/Utilities     Has Housing: Yes     Worried About Losing Housing: No     Unable to Get Utilities: No     Family History   Problem Relation Age of Onset    Cancer Neg     Diabetes Neg     Heart Disorder Neg     Hypertension Neg     Lipids Neg      Reviewed, see above    Medications:    acetaminophen    cefepime    enoxaparin    melatonin    ondansetron    prochlorperazine    polyethylene glycol (PEG 3350)    sennosides    bisacodyl    fleet enema    glucose **OR** glucose **OR** glucose-vitamin C **OR** dextrose **OR** glucose **OR** glucose **OR** glucose-vitamin C    insulin aspart    insulin degludec    insulin aspart    lacosamide    levETIRAcetam    LORazepam     Allergies:  Allergies[1]    Physical Exam:  Vitals:    02/23/25 1327   BP:    Pulse: (!) 36   Resp: 12   Temp:      Vitals signs and nursing note reviewed.   Constitutional:       Appearance: Normal appearance.   HENT:      Head: Normocephalic and atraumatic.      Mouth: Mucous membranes are moist.   Neck:      Musculoskeletal: Neck supple.   Cardiovascular:      Rate and Rhythm: Normal rate.   Pulmonary:      Effort: Pulmonary effort is normal. No respiratory distress.   Abdominal:      General: Abdomen is flat. There  is no distension.      Palpations: Abdomen is soft. There is no mass.      Tenderness: There is no tenderness. There is no guarding or rebound.      Hernia: No hernia is present.   Musculoskeletal:      Right lower leg: No edema.      Left lower leg: No edema.   Skin:     General: Skin is warm and dry.   Neurological:      Mental Status: Alert and oriented to person, place, and time.     Laboratory data:  I have independently reviewed all lab results; including old microbiological results.  Lab Results   Component Value Date    WBC 12.1 02/23/2025    HGB 10.7 02/23/2025    HCT 33.2 02/23/2025    .0 02/23/2025    CREATSERUM 0.66 02/23/2025    BUN <5 02/23/2025     02/23/2025    K 3.6 02/23/2025     02/23/2025    CO2 30.0 02/23/2025     02/23/2025    CA 8.8 02/23/2025    ALB 3.6 02/23/2025    ALKPHO 148 02/23/2025    BILT 0.3 02/23/2025    TP 5.8 02/23/2025    AST 13 02/23/2025    ALT 18 02/23/2025        Recent Labs   Lab 02/23/25  0955   RBC 3.90*   HGB 10.7*   HCT 33.2*   MCV 85.1   MCH 27.4   MCHC 32.2   RDW 13.7   NEPRELIM 7.89*   WBC 12.1*   .0       Microbiology data:  Hospital Encounter on 02/22/25   1. Urine Culture, Routine     Status: Abnormal (Preliminary result)    Collection Time: 02/22/25 11:04 AM    Specimen: Urine, clean catch   Result Value Ref Range    Urine Culture 10,000 - 50,000 CFU/ML Pseudomonas aeruginosa (A) N/A         Radiology:  I have reviewed all imaging data available independently.   Chest x-ray on 2/22:  Left basilar atelectasis/consolidation, question of aspiration    CT brain on 12/22/2025: No acute findings    Impression:  Janes Heart is a 59 year old male with     Pseudomonal UTI in this patient, presenting here with multiple seizures  Neurology currently following for the seizures  The patient is not having any  symptoms; however, given his presentation, will elect to treat him  Currently on IV cefepime after discussion with ID  Question  of aspiration pneumonia as seen on the chest x-ray  The patient is otherwise not hypoxic  Currently on cefepime  Leukocytosis  Reactive in the setting of all the above  Will continue to trend  Status epilepticus  Management as per neurology  Reported allergy to piperacillin-tazobactam  Unknown reaction    Recommendations:     Continue IV cefepime pending susceptibilities  Management of seizure disorder as per primary team  Continue to monitor daily labs for antibiotic toxicity  Further recommendations will depend on the above work-up and clinical progress     The plan of care was discussed with the primary hospital team, Adi Dawson DO     Recommendations were also discussed with the patient; all questions were answered.     Thank you for this consultation. Please don't hesitate to call the ID team for questions or any acute changes in patient's clinical condition.    Please note that this report has been produced using speech recognition software and may contain errors related to that system including, but not limited to, errors in grammar, punctuation, and spelling, as well as words and phrases that possibly may have been recognized inappropriately.  If there are any questions or concerns, contact the dictating provider for clarification.    The  [employer] Cures Act makes medical notes like these available to patients in the interest of transparency. Please be advised this is a medical document. Medical documents are intended to carry relevant information, facts as evident, and the clinical opinion of the practitioner. The medical note is intended as peer to peer communication and may appear blunt or direct. It is written in medical language and may contain abbreviations or verbiage that are unfamiliar.     Cong Lion MD  [facility]. Tel: [phone]. Fax: [phone].     Janes Heart : 1965 MRN: [id number] [facility]: [id number]          [1]   Allergies  Allergen Reactions     Morphine UNKNOWN    Other UNKNOWN     Opioids listed on radha face sheet    Piperacillin UNKNOWN    Zosyn UNKNOWN

## 2025-02-23 NOTE — PROGRESS NOTES
Patient removed EEG and refused to have it replaced. Stated to charge nurse \"I am not putting that back on!\" EEG not replaced on the patient

## 2025-02-23 NOTE — PROGRESS NOTES
Received pt A&Ox2.  on RA. NSR on tele. Lovenox for VTE prophylaxis. Flagyl. Seizure precautions. Tolerating diet QID accuchecks. 1:1 feed. Voids via primofit. Bedbound. R leg amputee. Tylenol Pain. Plan of care continues, no further needs at this time.

## 2025-02-23 NOTE — CONSULTS
[facility]   NEUROLOGY   CONSULT NOTE    Admission date: 2/22/2025  Reason for Consult: Status epilepticus.  Chief Complaint:   Chief Complaint   Patient presents with    Seizure Disorder   ________________________________________________________________    History     History of Presenting Illness  59 year old male with multiple medical problems including hypertension, hyperlipidemia, coronary artery disease, congestive heart failure, brought to emergency department after he was noted to have active seizures at nursing home and remained confused.  Patient was noticed to be more confused by the EMS team patient.  While the patient was being wheeled to the emergency department he had another seizure and was given Versed.  Patient apparently has residual left-sided weakness due to prior stroke.  Patient when seen today was awake, responsive, answering questions, complaining of generalized bodyaches, denying any prior history of seizures and stating that he was told that he has tumor in his head at [facility].  I did not find evidence of that on review of external medical records.    History obtained from patient chart review.    Past Medical History:    Anemia    Cancer (HCC)    Cardiomyopathy (HCC)    Congestive heart disease (HCC)    Esophageal reflux    Essential hypertension    Hyperlipidemia    Neuropathy    Renal disorder    Type I (juvenile type) diabetes mellitus without mention of complication, not stated as uncontrolled     Past Surgical History:   Procedure Laterality Date    Below knee leg amputation Right     Cataract      Other surgical history      tendon 2006    Other surgical history      (L) hallux amputation 2006    Other surgical history       Social History     Socioeconomic History    Marital status: Single   Tobacco Use    Smoking status: Never   Vaping Use    Vaping status: Never Used   Substance and Sexual Activity    Alcohol use: Yes     Comment: social    Drug use: Not Currently      Social Drivers of Health     Food Insecurity: No Food Insecurity (2/22/2025)    NCSS - Food Insecurity     Worried About Running Out of Food in the Last Year: No     Ran Out of Food in the Last Year: No   Transportation Needs: No Transportation Needs (2/22/2025)    NCSS - Transportation     Lack of Transportation: No   Housing Stability: Not At Risk (2/22/2025)    NCSS - Housing/Utilities     Has Housing: Yes     Worried About Losing Housing: No     Unable to Get Utilities: No     Family History   Problem Relation Age of Onset    Cancer Neg     Diabetes Neg     Heart Disorder Neg     Hypertension Neg     Lipids Neg      Allergies Allergies[1]    Home Meds  Current Outpatient Medications   Medication Instructions    acetaminophen (TYLENOL EXTRA STRENGTH) 1,000 mg, 3 times daily    collagenase (SANTYL) 250 UNIT/GM External Ointment Daily    glucose (GLUTOSE 15) 40% Oral Gel Give 1 kit by mouth every 1 hours as needed for hypoglycemia    Gvoke HypoPen 1-Pack 1 mg, Once as needed    insulin aspart (NOVOLOG) 8 Units, 3 times daily    insulin aspart 100 Units/mL Subcutaneous Solution Pen-injector Inject as per sliding scale: if  150-200 = 2 units  201-250 = 4 units  251-300 = 6 units  301- 350 = 8 units  Subcutaneously before meals and at bedtime for diabetes greater than 350 give 10 unites and call provider    lacosamide (VIMPAT) 100 mg, Every 12 hours    Lantus SoloStar 15 Units, Daily    levetiracetam (KEPPRA) 1,000 mg, Oral, Every 12 hours    lidocaine 4 % External Patch 1 patch, Transdermal, Daily, On for 12 hours and off for 12 hours    LORazepam Intensol 2 mg/mL Oral Conc 0.5 mL, Every 2 hour PRN    LORazepam Intensol 2 mg/mL Oral Conc 2 mL, Every 15 min PRN    omeprazole (PRILOSEC) 20 mg, Every morning    Silver (OPTIFOAM GENTLE AG DRESSING EX) Apply topically. Apply to left heel topically as needed for skin condition. Cleanse area with NS, pat dry, apply santyl and cover with foam dressing.     Scheduled  Meds:   cefepime  2 g Intravenous Q8H    enoxaparin  40 mg Subcutaneous Daily    insulin aspart  2-10 Units Subcutaneous TID AC and HS    insulin degludec  12 Units Subcutaneous Daily    insulin aspart  4 Units Subcutaneous TID CC    lacosamide  100 mg Oral q12h    levETIRAcetam  1,000 mg Intravenous Q12H     Continuous Infusions:   sodium chloride 100 mL/hr at 02/23/25 0135     PRN Meds:  acetaminophen    melatonin    ondansetron    prochlorperazine    polyethylene glycol (PEG 3350)    sennosides    bisacodyl    fleet enema    glucose **OR** glucose **OR** glucose-vitamin C **OR** dextrose **OR** glucose **OR** glucose **OR** glucose-vitamin C    LORazepam    OBJECTIVE   VITAL SIGNS:   Temp:  [97.7 °F (36.5 °C)-99 °F (37.2 °C)] 98.4 °F (36.9 °C)  Pulse:  [] 36  Resp:  [12-24] 12  BP: (129-155)/(60-84) 129/60  SpO2:  [84 %-100 %] 84 %    PHYSICAL EXAM:    NEUROLOGIC:    Mental Status:  A&O x 3, Follows simple commands, no obvious aphasia, mild dysarthria.  Attention, short-term memory seems impaired  Cranial nerves: PERRL.  Visual fields full.  EOMI.  Face symmetric with normal movement bilaterally.  Hearing grossly intact. Tongue midline with normal movements.   Motor: Residual left-sided weakness with contractures noted.  Normal right upper extremity strength noted.  Right lower extremity BKA noted.    Sensation: Intact to light touch bilaterally  Cerebellar: Normal Finger-To-Nose test on the right.      LABORATORY DATA:  Last 24 hour labs were reviewed in detail.  Recent Labs   Lab 02/22/25  1104 02/23/25  0955    146*   K 3.0* 3.6    109   CO2 30.0 30.0   * 129*   BUN 10 <5*   CREATSERUM 0.83 0.66*     Recent Labs   Lab 02/22/25  1104 02/23/25  0955   WBC 11.3* 12.1*   HGB 11.8* 10.7*   .0 255.0     Recent Labs   Lab 02/22/25  1104 02/23/25  0955   ALT 29 18   AST 17 13     No results for input(s): \"MG\", \"PHOS\" in the last 168 hours.  Last A1c value was  % done  .       Radiology:     CT scan of the head:  CONCLUSION:    1. No acute intracranial hemorrhage.   2. Ventricular enlargement is out of proportion to cortical enlargement suggesting normal pressure hydrocephalus, correlate clinically.   3. Bilateral low-attenuation within the white matter suggests encephalomalacia from prior insults.  A superimposed acute infarct cannot be entirely excluded.  An MRI of the brain can be performed for further evaluation as clinically indicated.   4. If patient has prior studies of the brain, these would be helpful for further evaluation and to assess stability of the above findings.   ASSESSMENT/PLAN   59 year old male with:    Status epilepticus.  Patient was loaded in the emergency department on recommendations from critical care consultant.  Patient to continue Keppra 1 g twice daily.  Continuous EEG monitoring showed evidence of marked diffuse slowing.  No evidence of electrographic seizures were noted.  CT scan concerning for normal pressure hydrocephalus.  Patient to be evaluated further once more stable.  Patient states that she has been diagnosed with brain tumor.  I could not find much evidence on medical record.  Advise MRI of the brain for further evaluation.      Principal Problem:    Seizure disorder (HCC)  Active Problems:    Aspiration pneumonia of left lung, unspecified aspiration pneumonia type, unspecified part of lung (HCC)         Daquan Selby MD  Neurohospitalist  [facility]    Disclaimer: This record was dictated using Dragon software. There may be errors due to voice recognition problems that were not realized and corrected during the completion of the note.           [1]   Allergies  Allergen Reactions    Morphine UNKNOWN    Other UNKNOWN     Opioids listed on radha face sheet    Piperacillin UNKNOWN    Zosyn UNKNOWN

## 2025-02-23 NOTE — PLAN OF CARE
Received patient on room air, saturating well. VSS. Medications administered per the MAR and patient needs addressed. Patient is A & O x2, incontinent with primofit in place, bedbound at baseline. Family at bedside, updated on plan of care.     Sister called to complete MRI screening form, no answer.

## 2025-02-23 NOTE — PROGRESS NOTES
[facility] Hospitalist Progress Note     CC: Hospital Follow up    PCP: Julianne Zambrano MD       Assessment/Plan:     Principal Problem:    Seizure disorder (HCC)  Active Problems:    Aspiration pneumonia of left lung, unspecified aspiration pneumonia type, unspecified part of lung (HCC)      Patient is a 59 year old male with PMH sig for hypertension, hyperlipidemia, CHF, type 1 diabetes, status post right BKA history of seizures who was brought from nursing home for evaluation of seizures.     Breakthrough seizures  History of seizure disorder  Rule out infection  - Loaded with Keppra, continue Keppra 1000 mg twice daily  - Continue home lacosamide  -Add as needed Ativan for breakthrough seizures  - Telemetry  - Neurology consult, appreciate recommendations  -Treat underlying infection if any    Leukocytosis  Pseudomonas UTI  Possible aspiration pneumonitis in the setting of seizures   lactic acidosis  -Elevated lactate of 5 in the setting of seizures, repeat normal  -Blood cultures x 2 pending  - Urine cultures growing Pseudomonas  - Change antibiotics to IV cefepime, has allergies to penicillin  - ID on consult, case discussed    Type 1 diabetes with hyperglycemia  - Uses pump at home however pump is not present  - Basal bolus regimen, Accu-Cheks, ISS  - Will start with 12 units Tresiba daily with 4 units NovoLog 3 times daily with meals-adjust as needed  - Diabetes educator consult    PCP  - POA: Sister Lenore  - CODE STATUS: DNR/select  -Has ACP documents and POA paperwork, RN/social work to scan    FN:  - IVF: Discontinue fluids  - Diet: Diabetic    DVT Prophy: SCDs, Lovenox  Atrophy: PT/OT  Lines: PIV    Dispo: Pending clinical status    Outpatient records or previous hospital records reviewed.     Further recommendations pending patient's clinical course.  [facility] hospitalist to continue to follow patient while in house    Patient and/or patient's family given opportunity to ask questions and note understanding and  agreeing with therapeutic plan as outlined    Thank You,  DO Amy Orosco Hospitalist  Answering Service number: [phone]     Subjective:     Seen and examined at bedside he is more alert and participating.  Wants to go home.  He confirms that his sister Lenore is his power of .    OBJECTIVE:    Blood pressure 129/60, pulse (!) 36, temperature 98.4 °F (36.9 °C), temperature source Oral, resp. rate 12, height 6' (1.829 m), weight 205 lb 0.4 oz (93 kg), SpO2 (!) 84%.    Temp:  [97.7 °F (36.5 °C)-99 °F (37.2 °C)] 98.4 °F (36.9 °C)  Pulse:  [29-99] 36  Resp:  [12-24] 12  BP: (129-155)/(60-83) 129/60  SpO2:  [84 %-100 %] 84 %      Intake/Output:    Intake/Output Summary (Last 24 hours) at 2/23/2025 1441  Last data filed at 2/23/2025 0840  Gross per 24 hour   Intake --   Output 4200 ml   Net -4200 ml       Last 3 Weights   02/22/25 1454 205 lb 0.4 oz (93 kg)   02/22/25 1032 205 lb 0.4 oz (93 kg)   05/07/23 1710 205 lb 0.4 oz (93 kg)   07/17/13 1224 205 lb (93 kg)       Exam   Gen: No acute distress, alert and oriented x3, no focal neurologic deficits  Heent: NC AT, mucous memb clear, neck supple  Pulm: Lungs clear, normal respiratory effort  CV: Heart with regular rate and rhythm, no peripheral edema  Abd: Abdomen soft, nontender, nondistended, bowel sounds present  MSK: Right BKA, gait not assessed  Neuro: AO*3, motor intact, no sensory deficits      Data Review:       Labs:     Recent Labs   Lab 02/22/25  1104 02/23/25  0955   RBC 4.34 3.90*   HGB 11.8* 10.7*   HCT 36.1* 33.2*   MCV 83.2 85.1   MCH 27.2 27.4   MCHC 32.7 32.2   RDW 13.6 13.7   NEPRELIM 7.20 7.89*   WBC 11.3* 12.1*   .0 255.0         Recent Labs   Lab 02/22/25  1104 02/23/25  0955   * 129*   BUN 10 <5*   CREATSERUM 0.83 0.66*   EGFRCR 101 109   CA 8.9 8.8    146*   K 3.0* 3.6    109   CO2 30.0 30.0       Recent Labs   Lab 02/22/25  1104 02/23/25  0955   ALT 29 18   AST 17 13   ALB 3.8 3.6          Imaging:  CT BRAIN OR HEAD (CPT=70450)    Result Date: 2/22/2025  CONCLUSION:  1. No acute intracranial hemorrhage. 2. Ventricular enlargement is out of proportion to cortical enlargement suggesting normal pressure hydrocephalus, correlate clinically. 3. Bilateral low-attenuation within the white matter suggests encephalomalacia from prior insults.  A superimposed acute infarct cannot be entirely excluded.  An MRI of the brain can be performed for further evaluation as clinically indicated. 4. If patient has prior studies of the brain, these would be helpful for further evaluation and to assess stability of the above findings.    LOCATION:  [facility]   Dictated by (CST): Gretchen Horn MD on 2/22/2025 at 11:46 AM     Finalized by (CST): Gretchen Horn MD on 2/22/2025 at 11:50 AM       XR CHEST AP PORTABLE  (CPT=71045)    Result Date: 2/22/2025  CONCLUSION:  Minimal left basilar opacity may be due to a developing consolidation or aspiration.   LOCATION:  [facility]      Dictated by (CST): Juan Jorgensen MD on 2/22/2025 at 11:46 AM     Finalized by (CST): Juan Jorgensen MD on 2/22/2025 at 11:47 AM          Meds:      cefepime  2 g Intravenous Q8H    enoxaparin  40 mg Subcutaneous Daily    insulin aspart  2-10 Units Subcutaneous TID AC and HS    insulin degludec  12 Units Subcutaneous Daily    insulin aspart  4 Units Subcutaneous TID CC    lacosamide  100 mg Oral q12h    levETIRAcetam  1,000 mg Intravenous Q12H      sodium chloride 100 mL/hr at 02/23/25 0135       acetaminophen    melatonin    ondansetron    prochlorperazine    polyethylene glycol (PEG 3350)    sennosides    bisacodyl    fleet enema    glucose **OR** glucose **OR** glucose-vitamin C **OR** dextrose **OR** glucose **OR** glucose **OR** glucose-vitamin C    LORazepam      Supplementary Documentation:   DVT Mechanical Prophylaxis:   SCDs, Early ambuation  DVT Pharmacologic Prophylaxis   Medication    enoxaparin (Lovenox) 40 MG/0.4ML SUBQ injection 40 mg                 Code Status: DNAR/Selective Treatment  Milligan: No urinary catheter in place  Milligan Duration (in days):   Central line:    SIDDHARTH:

## 2025-02-24 LAB
EST. AVERAGE GLUCOSE BLD GHB EST-MCNC: 177 MG/DL (ref 68–126)
GLUCOSE BLD-MCNC: 140 MG/DL (ref 70–99)
GLUCOSE BLD-MCNC: 188 MG/DL (ref 70–99)
GLUCOSE BLD-MCNC: 260 MG/DL (ref 70–99)
GLUCOSE BLD-MCNC: 90 MG/DL (ref 70–99)
HBA1C MFR BLD: 7.8 % (ref ?–5.7)

## 2025-02-24 PROCEDURE — 99221 1ST HOSP IP/OBS SF/LOW 40: CPT

## 2025-02-24 PROCEDURE — 99232 SBSQ HOSP IP/OBS MODERATE 35: CPT

## 2025-02-24 RX ORDER — LEVETIRACETAM 500 MG/5ML
1500 INJECTION, SOLUTION, CONCENTRATE INTRAVENOUS EVERY 12 HOURS
Status: DISCONTINUED | OUTPATIENT
Start: 2025-02-24 | End: 2025-02-26

## 2025-02-24 RX ORDER — LACOSAMIDE 100 MG/1
150 TABLET ORAL EVERY 12 HOURS
Status: DISCONTINUED | OUTPATIENT
Start: 2025-02-24 | End: 2025-02-26

## 2025-02-24 NOTE — PROGRESS NOTES
[facility]  THOR Neurology Progress Note    Janes Heart Patient Status:  Inpatient    1965 MRN [id number]   Formerly [facility] 5NW-A Attending Paulina Wilkins MD   Hosp Day # 2 PCP Julianne Zambrano MD     CC: Seizure disorder    Subjective:  Patient seen for a follow up visit today, resting in bed, reports feeling fine. No acute reports over night. No neuro focal deficit noted.        MEDICATIONS:  No current outpatient medications on file.     Current Facility-Administered Medications   Medication Dose Route Frequency    acetaminophen (Tylenol Extra Strength) tab 500 mg  500 mg Oral Q4H PRN    ceFEPIme (Maxpime) 2 g in sodium chloride 0.9% 100 mL IVPB-MBP  2 g Intravenous Q8H    enoxaparin (Lovenox) 40 MG/0.4ML SUBQ injection 40 mg  40 mg Subcutaneous Daily    melatonin tab 3 mg  3 mg Oral Nightly PRN    ondansetron (Zofran) 4 MG/2ML injection 4 mg  4 mg Intravenous Q6H PRN    prochlorperazine (Compazine) 10 MG/2ML injection 5 mg  5 mg Intravenous Q8H PRN    polyethylene glycol (PEG 3350) (Miralax) 17 g oral packet 17 g  17 g Oral Daily PRN    sennosides (Senokot) tab 17.2 mg  17.2 mg Oral Nightly PRN    bisacodyl (Dulcolax) 10 MG rectal suppository 10 mg  10 mg Rectal Daily PRN    fleet enema (Fleet) rectal enema 133 mL  1 enema Rectal Once PRN    glucose (Dex4) 15 GM/59ML oral liquid 15 g  15 g Oral Q15 Min PRN    Or    glucose (Glutose) 40% oral gel 15 g  15 g Oral Q15 Min PRN    Or    glucose-vitamin C (Dex-4) chewable tab 4 tablet  4 tablet Oral Q15 Min PRN    Or    dextrose 50% injection 50 mL  50 mL Intravenous Q15 Min PRN    Or    glucose (Dex4) 15 GM/59ML oral liquid 30 g  30 g Oral Q15 Min PRN    Or    glucose (Glutose) 40% oral gel 30 g  30 g Oral Q15 Min PRN    Or    glucose-vitamin C (Dex-4) chewable tab 8 tablet  8 tablet Oral Q15 Min PRN    insulin aspart (NovoLOG) 100 Units/mL FlexPen 2-10 Units  2-10 Units Subcutaneous TID AC and HS    insulin degludec (Tresiba) 100  units/mL flextouch 12 Units  12 Units Subcutaneous Daily    insulin aspart (NovoLOG) 100 Units/mL FlexPen 4 Units  4 Units Subcutaneous TID CC    lacosamide (Vimpat) tab 100 mg  100 mg Oral q12h    levETIRAcetam (Keppra) 500 mg/5mL injection 1,000 mg  1,000 mg Intravenous Q12H    LORazepam (Ativan) 2 mg/mL injection 1 mg  1 mg Intravenous Q6H PRN       REVIEW OF SYSTEMS:  A 10-point system was reviewed.  Pertinent positives and negatives are noted in HPI.      PHYSICAL EXAMINATION:  VITAL SIGNS: /67 (BP Location: Right arm)   Pulse 91   Temp 98 °F (36.7 °C) (Oral)   Resp 16   Ht 72\"   Wt 205 lb 0.4 oz (93 kg)   SpO2 96%   BMI 27.81 kg/m²   GENERAL:  Patient is a 59 year old male in no acute distress.  HEENT:  Normocephalic, atraumatic  ABD: Soft, non tender  SKIN: Warm, dry, no rashes    NEUROLOGICAL:   Mental status: Oriented to person, place, situation but no  time   Speech: Fluent, some very mild dysarthria  Memory and comprehension: short-term memory impaired  Cranial Nerves: VFF, PERRL 3mm brisk, EOMI, no nystagmus, facial sensation intact, face symmetric, tongue midline, shoulder shrug equal  Motor: Left sided weakness with contractures as residual from hx of stroke no focal arm  weakness to the right, right BKA  Sensory: Intact to light touch bilaterally  Coordination: FTN intact on the right  Gait: Deferred      Imaging/Diagnostics:  CT BRAIN OR HEAD (CPT=70450)    Result Date: 2/22/2025  CONCLUSION:  1. No acute intracranial hemorrhage. 2. Ventricular enlargement is out of proportion to cortical enlargement suggesting normal pressure hydrocephalus, correlate clinically. 3. Bilateral low-attenuation within the white matter suggests encephalomalacia from prior insults.  A superimposed acute infarct cannot be entirely excluded.  An MRI of the brain can be performed for further evaluation as clinically indicated. 4. If patient has prior studies of the brain, these would be helpful for further  evaluation and to assess stability of the above findings.    LOCATION:  [facility]   Dictated by (CST): Gretchen Horn MD on 2/22/2025 at 11:46 AM     Finalized by (CST): Gretchen Horn MD on 2/22/2025 at 11:50 AM       XR CHEST AP PORTABLE  (CPT=71045)    Result Date: 2/22/2025  CONCLUSION:  Minimal left basilar opacity may be due to a developing consolidation or aspiration.   LOCATION:  [facility]      Dictated by (CST): Juan Jorgensen MD on 2/22/2025 at 11:46 AM     Finalized by (CST): Juan Jorgensen MD on 2/22/2025 at 11:47 AM          Labs:  Recent Labs   Lab 02/22/25  1104 02/23/25  0955   RBC 4.34 3.90*   HGB 11.8* 10.7*   HCT 36.1* 33.2*   MCV 83.2 85.1   MCH 27.2 27.4   MCHC 32.7 32.2   RDW 13.6 13.7   NEPRELIM 7.20 7.89*   WBC 11.3* 12.1*   .0 255.0         Recent Labs   Lab 02/22/25  1104 02/23/25  0955   * 129*   BUN 10 <5*   CREATSERUM 0.83 0.66*   EGFRCR 101 109   CA 8.9 8.8    146*   K 3.0* 3.6    109   CO2 30.0 30.0       Pre-morbid mRS 1-2      Assessment and Plan:    A 59 year old male with:     Status epilepticus. Patient was loaded in the emergency department with Keppra    - Continuous EEG monitoring showed evidence of marked diffuse slowing. No evidence of electrographic seizures were noted.   - Continue Keppra 1 g twice daily as well as home dose Vimpat 100 twice daily.  - CT head didn't report any acute intracranial hemorrhage. Ventricular enlargement is out of proportion to cortical enlargement suggesting normal pressure hydrocephalus. Patient to be evaluated further once more stable.   2. Pt reports he has been diagnosed with brain tumor. Not much evidence on medical record. MRI of the brain ordered for further evaluation - pending.    Plan of care discussed with pt, all questions answered. Case discussed with Dr. Nelson, further recommendations, if indicated to follow.      Is this a shared or split note between Advanced Practice Provider and Physician? Yes     Ema  Mikael, LYDIA  [facility]  2/24/2025, 9:24 AM  [place]# [id number]

## 2025-02-24 NOTE — PROGRESS NOTES
Infectious Disease Progress Note      Date of admission: 2/22/2025 10:29 AM     Reason for consult: Pseudomonal UTI    Referring physician: Paulina Wilkins MD    Subjective: Feels better.  No abdominal pain.  No nausea or vomiting.  No diarrhea.  No shortness of breath.  No cough or sputum production.    The rest of the systems were reviewed and found to be negative except was mentioned above    Interval events: This is a 59-year-old male patient, with history of seizure disorder, presenting here with multiple seizures.  UA with pyuria with urine culture now growing Pseudomonas, pan susceptible.  Currently on IV cefepime.    Medications:    acetaminophen    cefepime    enoxaparin    melatonin    ondansetron    prochlorperazine    polyethylene glycol (PEG 3350)    sennosides    bisacodyl    fleet enema    glucose **OR** glucose **OR** glucose-vitamin C **OR** dextrose **OR** glucose **OR** glucose **OR** glucose-vitamin C    insulin aspart    insulin degludec    insulin aspart    lacosamide    levETIRAcetam    LORazepam     Allergies:  Allergies[1]    Physical Exam:  Vitals:    02/24/25 0715   BP: 126/67   Pulse: 91   Resp: 16   Temp: 98 °F (36.7 °C)     Vitals signs and nursing note reviewed.   Constitutional:       Appearance: Normal appearance.   HENT:      Head: Normocephalic and atraumatic.      Mouth: Mucous membranes are moist.   Neck:      Musculoskeletal: Neck supple.   Cardiovascular:      Rate and Rhythm: Normal rate.   Pulmonary:      Effort: Pulmonary effort is normal. No respiratory distress.   Abdominal:      General: Abdomen is flat. There is no distension.      Palpations: Abdomen is soft. There is no mass.      Tenderness: There is no tenderness. There is no guarding or rebound.      Hernia: No hernia is present.   Skin:     General: Skin is warm and dry.   Neurological:      General: No focal deficit present.      Mental Status: Alert and oriented to person, place, and time.       Laboratory  data:  I have reviewed all the lab results independently.      Recent Labs   Lab 02/23/25  0955   RBC 3.90*   HGB 10.7*   HCT 33.2*   MCV 85.1   MCH 27.4   MCHC 32.2   RDW 13.7   NEPRELIM 7.89*   WBC 12.1*   .0      Microbiology data:  Hospital Encounter on 02/22/25   1. Blood Culture     Status: None (Preliminary result)    Collection Time: 02/22/25 12:58 PM    Specimen: Blood,peripheral   Result Value Ref Range    Blood Culture Result No Growth 1 Day N/A   2. Urine Culture, Routine     Status: Abnormal    Collection Time: 02/22/25 11:04 AM    Specimen: Urine, clean catch   Result Value Ref Range    Urine Culture 10,000 - 50,000 CFU/ML Pseudomonas aeruginosa (A) N/A       Susceptibility    Pseudomonas aeruginosa -  (no method available)     Cefepime 4 Sensitive      Ceftazidime <=1 Sensitive      Ciprofloxacin* <=1 Sensitive       * The current CLSI susceptibility breakpoint for ciprofloxacin is an DIYA of 0.5 mcg/mL. MICs above this should NOT be considered susceptible. Updated susceptibility reporting is in progress.     Meropenem <=1 Sensitive      Levofloxacin 0.5 Sensitive      Piperacillin + Tazobactam <=4 Sensitive      Tobramycin <=1 Sensitive         Radiology:  I have reviewed all imagining data available independently.   Chest x-ray on 2/22:  Left basilar atelectasis/consolidation, question of aspiration     CT brain on 12/22/2025: No acute findings    Impression:  Janes Heart is a 59 year old male with    Pseudomonal UTI  Currently on cefepime  Cultures were reviewed  Question of aspiration pneumonia seen on the chest x-ray  The patient is otherwise not having any respiratory symptoms  Cefepime will cover  Leukocytosis  Reactive slightly worse  Will continue to trend  Status epilepticus  Management as per neurology  Reported allergy to Zosyn  Unknown reaction      Recommendations:    Continue cefepime, will plan on a 7-day course through 3/1/2025  Management of seizures as per primary team  and neurology  Continue to monitor daily labs for antibiotic toxicities  Further recommendations will depend on the above work-up and clinical progress     The plan of care was discussed with the primary hospital team, Paulina Wilkins MD     Recommendations were also discussed with the patient; all questions were answered.     Thank you for this consultation. Please don't hesitate to call the ID team for questions or any acute changes in patient's clinical condition.    Please note that this report has been produced using speech recognition software and may contain errors related to that system including, but not limited to, errors in grammar, punctuation, and spelling, as well as words and phrases that possibly may have been recognized inappropriately.  If there are any questions or concerns, contact the dictating provider for clarification.    The 21st Century Cures Act makes medical notes like these available to patients in the interest of transparency. Please be advised this is a medical document. Medical documents are intended to carry relevant information, facts as evident, and the clinical opinion of the practitioner. The medical note is intended as peer to peer communication and may appear blunt or direct. It is written in medical language and may contain abbreviations or verbiage that are unfamiliar.     Cong Lion MD  [facility]. Tel: [phone]. Fax: [phone].     Janes ALFARO Sly : 1965 MRN: [id number] CSN: [id number]          [1]   Allergies  Allergen Reactions    Morphine UNKNOWN    Other UNKNOWN     Opioids listed on [facility]ha face sheet    Piperacillin UNKNOWN    Zosyn UNKNOWN

## 2025-02-24 NOTE — PLAN OF CARE
Patient is alert, oriented x 2-3. Maintains O2 sats on room air. VSS, afebrile. IV ABX. Seizure precautions, on IV keppra & PO vimpat. Patient is legally blind, set up assist for meals. Very good appetite noted. Blood glucose monitored & covered per MAR. MRI of head ordered, screening form completed with assist of POA/ sister Do & chart review. IV ABX, tolerating well. R arm CGM in place. R BKA present. Left boot on heel to protect. Patient & family updated on POC, rounded on routinely.     Problem: Patient/Family Goals  Goal: Patient/Family Long Term Goal  Description: Patient's Long Term Goal: Discharge     Interventions:  - Follow plan of care   - See additional Care Plan goals for specific interventions  Outcome: Progressing  Goal: Patient/Family Short Term Goal  Description: Patient's Short Term Goal:   2/22 NOC: Control seizure   2/23  noc: seizure control  Interventions:   - Anti convulsant meds, seizure precautions in place   - See additional Care Plan goals for specific interventions  Outcome: Progressing     Problem: SAFETY ADULT - FALL  Goal: Free from fall injury  Description: INTERVENTIONS:  - Assess pt frequently for physical needs  - Identify cognitive and physical deficits and behaviors that affect risk of falls.  - [place]ans fall precautions as indicated by assessment.  - Educate pt/family on patient safety including physical limitations  - Instruct pt to call for assistance with activity based on assessment  - Modify environment to reduce risk of injury  - Provide assistive devices as appropriate  - Consider OT/PT consult to assist with strengthening/mobility  - Encourage toileting schedule  Outcome: Progressing

## 2025-02-24 NOTE — PROGRESS NOTES
[facility] Hospitalist note    PCP: Julianne Zambrano MD    Chief Complaint:  F/u seizures    SUBJECTIVE:  Feels okay. Enjoying drinking apple juice.    OBJECTIVE:  Temp:  [98 °F (36.7 °C)-98.4 °F (36.9 °C)] 98 °F (36.7 °C)  Pulse:  [36-93] 91  Resp:  [12-24] 16  BP: (126-171)/(60-85) 126/67  SpO2:  [84 %-97 %] 96 %    Intake/Output:    Intake/Output Summary (Last 24 hours) at 2/24/2025 0912  Last data filed at 2/24/2025 0500  Gross per 24 hour   Intake 100 ml   Output 750 ml   Net -650 ml       Last 3 Weights   02/22/25 1454 205 lb 0.4 oz (93 kg)   02/22/25 1032 205 lb 0.4 oz (93 kg)   05/07/23 1710 205 lb 0.4 oz (93 kg)   07/17/13 1224 205 lb (93 kg)       Exam  Gen: No acute distress  HEENT: anicteric sclera, MMM  Pulm: Lungs clear, normal respiratory effort  CV: Heart with regular rate and rhythm, no peripheral edema  Abd: Abdomen soft, nontender, nondistended, no organomegaly, bowel sounds present  MSK: h/o bka on R  Skin: no rashes or lesions  Neuro: A&OX3, no focal deficits    Data Review:         Labs:     Recent Labs   Lab 02/22/25  1104 02/23/25  0955   WBC 11.3* 12.1*   HGB 11.8* 10.7*   MCV 83.2 85.1   .0 255.0   NE 7.20 7.89*   LYMABS 2.24 2.16       Recent Labs   Lab 02/22/25  1104 02/23/25  0955    146*   K 3.0* 3.6    109   CO2 30.0 30.0   BUN 10 <5*   CREATSERUM 0.83 0.66*   CA 8.9 8.8   * 129*       Recent Labs   Lab 02/22/25  1104 02/23/25  0955   ALT 29 18   AST 17 13   ALB 3.8 3.6       No results for input(s): \"TROP\", \"CK\", \"PBNP\", \"PCT\" in the last 168 hours.    No results for input(s): \"CRP\", \"ROXANNA\", \"LDH\", \"DDIMER\" in the last 168 hours.    Recent Labs   Lab 02/23/25  0549 02/23/25  1102 02/23/25  1615 02/23/25  2233 02/24/25  0549   PGLU 235* 154* 231* 81 260*       Meds:   Scheduled Medication:   cefepime  2 g Intravenous Q8H    enoxaparin  40 mg Subcutaneous Daily    insulin aspart  2-10 Units Subcutaneous TID AC and HS    insulin degludec  12 Units Subcutaneous Daily     insulin aspart  4 Units Subcutaneous TID CC    lacosamide  100 mg Oral q12h    levETIRAcetam  1,000 mg Intravenous Q12H     Continuous Infusing Medication:  PRN Medication:  acetaminophen    melatonin    ondansetron    prochlorperazine    polyethylene glycol (PEG 3350)    sennosides    bisacodyl    fleet enema    glucose **OR** glucose **OR** glucose-vitamin C **OR** dextrose **OR** glucose **OR** glucose **OR** glucose-vitamin C    LORazepam       Microbiology:    Hospital Encounter on 02/22/25   1. Blood Culture     Status: None (Preliminary result)    Collection Time: 02/22/25 12:58 PM    Specimen: Blood,peripheral   Result Value Ref Range    Blood Culture Result No Growth 1 Day N/A   2. Urine Culture, Routine     Status: Abnormal    Collection Time: 02/22/25 11:04 AM    Specimen: Urine, clean catch   Result Value Ref Range    Urine Culture 10,000 - 50,000 CFU/ML Pseudomonas aeruginosa (A) N/A       Susceptibility    Pseudomonas aeruginosa -  (no method available)     Cefepime 4 Sensitive      Ceftazidime <=1 Sensitive      Ciprofloxacin* <=1 Sensitive       * The current CLSI susceptibility breakpoint for ciprofloxacin is an DIYA of 0.5 mcg/mL. MICs above this should NOT be considered susceptible. Updated susceptibility reporting is in progress.     Meropenem <=1 Sensitive      Levofloxacin 0.5 Sensitive      Piperacillin + Tazobactam <=4 Sensitive      Tobramycin <=1 Sensitive        No results found for: \"COVID19\"     Assessment/Plan:   Patient is a 59 year old male with PMH sig for hypertension, CHF, type 1 diabetes, status post right BKA history of seizures who was brought from nursing home for evaluation of seizures.      Breakthrough seizures  Status epilepticus  - keppra bid  - continuous eeg with marked diffuse slowing  - ?brain tumor history, MRI ordered. CT scan concerning for NPH as well.      Leukocytosis  Abnormal UA/pseudomonal UTI  Possible aspiration pneumonitis in the setting of seizures    lactic acidosis  -ID following, on IV cefepime     Type 1 diabetes with hyperglycemia  - Uses pump at home however pump is not present  - Basal bolus regimen, Accu-Cheks, ISS  - Will start with 12 units Tresiba daily with 4 units NovoLog 3 times daily with meals-adjust as needed  - Diabetes educator consult, they will see patient     Dnr/select     DVT Prophy: Waylon, Tre Wilkins MD  [facility] Hospitalist  Pager: [phone]

## 2025-02-24 NOTE — CM/SW NOTE
02/24/25 1200   CM/SW Referral Data   Referral Source Social Work (self-referral)   Reason for Referral Discharge planning   Informant EMR;Clinical Staff Member;Sibling   Patient Info   Patient's Home Environment Long Term Care Facility   Post Acute Care Provider Upon Admission [facility]   Discharge Needs   Anticipated D/C needs Long term care facility;Transportation services;To be determined     Patient is a 60 y/o male who admitted with Seizure disorder, Aspiration pneumonia of left lung.  Reviewed paper chart, located DNR, HC POA, and [facility] paperwork. Securely sent to medical records to upload to EMR.     Spoke with sister/HC POA (Do [phone]) to discuss discharge planning. Patient resides at [facility] for LTC. He was previously at [facility] and moved to [facility]. Confirmed plan for him to return to [facility] at discharge.    Sent referral to [facility] on aidin. Per Tonya in Admissions from [facility], he is no longer under hospice care and is LTC private pay.     SW/CM to remain available for support and/or discharge planning.    NICANOR Bustamante  Discharge Planner

## 2025-02-24 NOTE — PROCEDURES
LONG-TERM VIDEO EEG REPORT;    Reason for Examination: Seizures    Technical Summary:   18 Channels of EEG and 1 Channel of EKG was performed utilizing internation 10/20 method. Motion, muscle and machine artifacts were noted. Patient pulled the electrodes multiple times due to agitation.      Recording Start date/time: 02/22/2025 at 1729  Recording end date/time: 02/23/2025 at 1728      Background Activity:   The background activity consisted of 7-8 Hz waveforms, reactive to eye opening/ external stimulation.    Abnormality:  Throughout the recording low to medium voltage, polymorphic, 2 to 4 Hz slow activity was noted diffusely over both hemispheres. Marked artifact were noted.      Activation:    Hyperventilation:   Not Performed.    Photic Stimulation:  Driving response seen.No       Sleep:  Stage I sleep seen.       Impression:  This is an Abnormal prolonged Video EEG study. No focal, lateralized or generalized epileptiform activity seen. Marked artifacts were noted.  Moderate diffuse slowing into delta range was noted.  This constellation of findings can be seen in encephalopathy due to metabolic/toxic etiology, medication effects or diffuse cerebral injury.  Clinical correlation is recommended.        Daquan Selby MD  [facility].

## 2025-02-24 NOTE — PLAN OF CARE
Problem: Patient/Family Goals  Goal: Patient/Family Short Term Goal  Description: Patient's Short Term Goal:   2/22 NOC: Control seizure   2/23  noc: seizure control  Interventions:   - Anti convulsant meds, seizure precautions in place   - See additional Care Plan goals for specific interventions  Outcome: Progressing     Problem: SAFETY ADULT - FALL  Goal: Free from fall injury  Description: INTERVENTIONS:  - Assess pt frequently for physical needs  - Identify cognitive and physical deficits and behaviors that affect risk of falls.  - [place] fall precautions as indicated by assessment.  - Educate pt/family on patient safety including physical limitations  - Instruct pt to call for assistance with activity based on assessment  - Modify environment to reduce risk of injury  - Provide assistive devices as appropriate  - Consider OT/PT consult to assist with strengthening/mobility  - Encourage toileting schedule  Outcome: Progressing     Patient received tonight alert and oriented to person , place, and situation.  He is on room air, tele sr, denies pain, no seizure activity noted.  Discussed poc, he will need reinforcement.  Safety and comfort measures provided.

## 2025-02-24 NOTE — CONSULTS
[facility]  Diabetes Consult Note    Janes Heart Patient Status:  Inpatient    1965 MRN [id number]   Location [facility] 5NW-A Attending Paulina Wilkins MD   Hosp Day # 2 PCP Julianne Zambrano MD     Reason for Consult:   Type 1 diabetes  ?Pump use at home    Provider Requesting Consult:  Adi Dawson DO     Diagnosis:  Patient Active Problem List   Diagnosis    Charcot foot due to diabetes mellitus (HCC)    Polyneuropathy in diabetes(357.2)    Midfoot collapse    Ulcer of other part of foot    Seizure disorder (HCC)    Aspiration pneumonia of left lung, unspecified aspiration pneumonia type, unspecified part of lung (HCC)         Medical History:  Past Medical History:    Anemia    Cancer (HCC)    Cardiomyopathy (HCC)    Congestive heart disease (HCC)    Esophageal reflux    Essential hypertension    Hyperlipidemia    Neuropathy    Renal disorder    Type I (juvenile type) diabetes mellitus without mention of complication, not stated as uncontrolled     Past Surgical History:   Procedure Laterality Date    Below knee leg amputation Right     Cataract      Other surgical history      tendon 2006    Other surgical history      (L) hallux amputation 2006    Other surgical history       Family History   Problem Relation Age of Onset    Cancer Neg     Diabetes Neg     Heart Disorder Neg     Hypertension Neg     Lipids Neg          Diabetes history:  Type:  1  Onset: age 11  Family history of DM: strong fam hx of DM1,       Allergies: Allergies[1]      Medications: Complete list reviewed. Active diabetes medications include tresiba/aspart.    PHYSICAL EXAM  Vitals:    25 1047   BP: 150/83   Pulse: 74   Resp: 18   Temp: 97.8 °F (36.6 °C)     General Appearance:  alert, well developed, in no acute distress  Eyes:  normal conjunctivae, sclera  Respiratory:  breathing comfortably  Musculoskeletal:  normal muscle strength and tone  Skin:  normal moisture and skin texture    Labs:  Recent Labs    Lab 02/23/25  0549 02/23/25  1102 02/23/25  1615 02/23/25  2233 02/24/25  0549   PGLU 235* 154* 231* 81 260*     Recent Labs     02/22/25  1104 02/22/25  1554 02/23/25  0955 02/23/25  1102 02/24/25  0549     --  146*  --   --      --  109  --   --    CO2 30.0  --  30.0  --   --    BUN 10  --  <5*  --   --    CREATSERUM 0.83  --  0.66*  --   --    PGLU  --    < >  --    < > 260*   CA 8.9  --  8.8  --   --    ALB 3.8  --  3.6  --   --     < > = values in this interval not displayed.                  History of Present Illness: Janes Heart is a 59 year old male with PMHx hypertension, hyperlipidemia, CHF, type 1 diabetes, status post right BKA,  history of seizures  admitted 2/22/2025 for evaluation of breakthrough seizures.      Presented 2/22/25 to ER, actively seizing from nursing home. Hx of DM1 diagnosed age 11. Reports several incidents of DKA. Previously wore omnipod insulin pump but hasn't in a while. Strong fam hx of DM, brother dx'd age 55. Currently on regular diet but endorses poor appetite.  He really wants apple juice.  Also noted to have pseudomonas UTI, ID following.     Did have overnight hypoglycemia, BG at 2230 was 81, this AM .      Assessment/Recommendations:  PMHX DM1 dx'd age 11 historically on MDI. Will continue present MDI management with plan to DC with MDI. Patient does not follow w/ an endocrinologist but instead sees an external PCP through [facility]. He feels comfortable returning DM management with his current provider. BG relatively stable with one low overnight but it appears patient got a large correction bolus for bed which is likely contributing. Will lighten ISF.     Plan:  Inpatient recommendations -   Degludec = continue 12u daily for now  Aspart = adjust ISF 1:20--> 40>140 given low overnight.   Can keep fixed dose aspart 4u w/ meals for now; consider ICR if has another low  Discharge recommendations -   Start:  Tresiba = 12u every day   Novolog = 4u w/  meals + ISF 1:30>140  Wears dexcom G7 at home / nursing home    Nursing Recommendations:  RN to teach blood glucose monitoring with bedside glucometer  RN to teach insulin administration with an insulin pen - Reinforce the need to remove 2 caps from the home insulin pen needle   Patient to administer subcutaneously insulin injection with insulin pen under nursing supervision once return demonstration has verified patient's skill  RN to teach survival skills:  Provide \"Understanding Diabetes: Survival Skills and More!\" booklet and have patient/family view video on TV Education channel/Diabetes  Assist the patient/family with access to view the following videos:  \"Everyone Can Count Carbohydrates\" - to view prior to dietitian consult  \"Taking Insulin\" - to view prior to teaching administering a subcutaneous insulin injection      Prescription Recommendations:  Medicare:  Insulin:   Novolog, Fiasp, Apidra, Admelog, Levemir, Lantus, Basaglar, Tresiba, Toujeo  (If no secondary insurance, may need prior auth)  Supplies:  BD pen needles (Lois)      Provider follow up recommendations:  PCP   [facility] - opts to continue management with PCP w/ JUSTIN Avila  2/24/2025  9:14 AM         [1]   Allergies  Allergen Reactions    Morphine UNKNOWN    Other UNKNOWN     Opioids listed on radha face sheet    Piperacillin UNKNOWN    Zosyn UNKNOWN

## 2025-02-25 ENCOUNTER — APPOINTMENT (OUTPATIENT)
Dept: MRI IMAGING | Facility: HOSPITAL | Age: 60
End: 2025-02-25
Attending: Other
Payer: MEDICARE

## 2025-02-25 LAB
GLUCOSE BLD-MCNC: 171 MG/DL (ref 70–99)
GLUCOSE BLD-MCNC: 200 MG/DL (ref 70–99)
GLUCOSE BLD-MCNC: 243 MG/DL (ref 70–99)
GLUCOSE BLD-MCNC: 277 MG/DL (ref 70–99)
GLUCOSE BLD-MCNC: 380 MG/DL (ref 70–99)
GLUCOSE BLD-MCNC: 429 MG/DL (ref 70–99)
GLUCOSE BLD-MCNC: 434 MG/DL (ref 70–99)

## 2025-02-25 PROCEDURE — 99232 SBSQ HOSP IP/OBS MODERATE 35: CPT

## 2025-02-25 PROCEDURE — 99231 SBSQ HOSP IP/OBS SF/LOW 25: CPT

## 2025-02-25 PROCEDURE — 70553 MRI BRAIN STEM W/O & W/DYE: CPT | Performed by: OTHER

## 2025-02-25 RX ORDER — LEVOFLOXACIN 750 MG/1
750 TABLET, FILM COATED ORAL
Status: DISCONTINUED | OUTPATIENT
Start: 2025-02-25 | End: 2025-02-26

## 2025-02-25 RX ORDER — GADOTERATE MEGLUMINE 376.9 MG/ML
15 INJECTION INTRAVENOUS
Status: COMPLETED | OUTPATIENT
Start: 2025-02-25 | End: 2025-02-25

## 2025-02-25 RX ORDER — ECHINACEA PURPUREA EXTRACT 125 MG
1 TABLET ORAL
Status: DISCONTINUED | OUTPATIENT
Start: 2025-02-25 | End: 2025-02-26

## 2025-02-25 NOTE — PROGRESS NOTES
[facility] Hospitalist note    PCP: Julianne Zambrano MD    Chief Complaint:  F/u seizures    SUBJECTIVE:  Continues to be enthusiastic about apple juice. Drank 3 cups this morning and glc into 400s.     OBJECTIVE:  Temp:  [98 °F (36.7 °C)-98.3 °F (36.8 °C)] 98.3 °F (36.8 °C)  Pulse:  [71-87] 71  Resp:  [11-18] 13  BP: (118-175)/(54-99) 118/78  SpO2:  [100 %] 100 %    Intake/Output:    Intake/Output Summary (Last 24 hours) at 2/25/2025 1654  Last data filed at 2/25/2025 1452  Gross per 24 hour   Intake --   Output 2700 ml   Net -2700 ml       Last 3 Weights   02/22/25 1454 205 lb 0.4 oz (93 kg)   02/22/25 1032 205 lb 0.4 oz (93 kg)   05/07/23 1710 205 lb 0.4 oz (93 kg)   07/17/13 1224 205 lb (93 kg)       Exam  Gen: No acute distress  HEENT: anicteric sclera, MMM  Pulm: Lungs clear, normal respiratory effort  CV: Heart with regular rate and rhythm, no peripheral edema  Abd: Abdomen soft, nontender, nondistended, no organomegaly, bowel sounds present  MSK: h/o bka on R  Skin: no rashes or lesions  Neuro: A&OX3, no focal deficits    Data Review:         Labs:     Recent Labs   Lab 02/22/25  1104 02/23/25  0955   WBC 11.3* 12.1*   HGB 11.8* 10.7*   MCV 83.2 85.1   .0 255.0   NE 7.20 7.89*   LYMABS 2.24 2.16       Recent Labs   Lab 02/22/25  1104 02/23/25  0955    146*   K 3.0* 3.6    109   CO2 30.0 30.0   BUN 10 <5*   CREATSERUM 0.83 0.66*   CA 8.9 8.8   * 129*       Recent Labs   Lab 02/22/25  1104 02/23/25  0955   ALT 29 18   AST 17 13   ALB 3.8 3.6       No results for input(s): \"TROP\", \"CK\", \"PBNP\", \"PCT\" in the last 168 hours.    No results for input(s): \"CRP\", \"ROXANNA\", \"LDH\", \"DDIMER\" in the last 168 hours.    Recent Labs   Lab 02/25/25  0451 02/25/25  1110 02/25/25  1111 02/25/25  1330 02/25/25  1533   PGLU 171* 434* 429* 380* 277*       Meds:   Scheduled Medication:   levoFLOXacin  750 mg Oral Daily @ 0700    insulin aspart  1-10 Units Subcutaneous TID AC    insulin aspart  1-5 Units  Subcutaneous TID CC and HS    lacosamide  150 mg Oral q12h    levETIRAcetam  1,500 mg Intravenous Q12H    enoxaparin  40 mg Subcutaneous Daily    insulin degludec  12 Units Subcutaneous Daily     Continuous Infusing Medication:  PRN Medication:  sodium chloride    acetaminophen    melatonin    ondansetron    prochlorperazine    polyethylene glycol (PEG 3350)    sennosides    bisacodyl    fleet enema    glucose **OR** glucose **OR** glucose-vitamin C **OR** dextrose **OR** glucose **OR** glucose **OR** glucose-vitamin C    LORazepam       Microbiology:    Hospital Encounter on 02/22/25   1. Blood Culture     Status: None (Preliminary result)    Collection Time: 02/22/25 12:58 PM    Specimen: Blood,peripheral   Result Value Ref Range    Blood Culture Result No Growth 2 Days N/A   2. Urine Culture, Routine     Status: Abnormal    Collection Time: 02/22/25 11:04 AM    Specimen: Urine, clean catch   Result Value Ref Range    Urine Culture 10,000 - 50,000 CFU/ML Pseudomonas aeruginosa (A) N/A       Susceptibility    Pseudomonas aeruginosa -  (no method available)     Cefepime 4 Sensitive      Ceftazidime <=1 Sensitive      Ciprofloxacin* <=1 Sensitive       * The current CLSI susceptibility breakpoint for ciprofloxacin is an DIYA of 0.5 mcg/mL. MICs above this should NOT be considered susceptible. Updated susceptibility reporting is in progress.     Meropenem <=1 Sensitive      Levofloxacin 0.5 Sensitive      Piperacillin + Tazobactam <=4 Sensitive      Tobramycin <=1 Sensitive        No results found for: \"COVID19\"    [facility] 10/24 MRI brain:  Lack of intravenous contrast diminishes sensitivity. Stable bifrontal craniotomy changes for previous resection of planum sphenoid alae meningioma. No significant extra-axial collections. Stable ex vacuo enlargement of bilateral frontal horns. No overall change in size or configuration of ventricular system. Generalized cerebral atrophy. No midline shift. Craniovertebral junction  appears unremarkable.     Areas of restricted diffusion involving the right posterior parietal lobe with additional scattered smaller areas of restricted diffusion involving bilateral posterior frontal, right occipital, and posterior right frontal lobes compatible with acute infarcts involving multiple vascular territories.     Areas of laminar necrosis associated with old right occipital infarct extending to temporal occipital junction with encephalomalacia.     Small foci of susceptibility in the dependent occipital horns bilaterally compatible with chronic blood products.     Encephalomalacia involving bilateral anterior inferior frontal lobes and anterior left temporal lobe, stable. Scattered chronic microvascular ischemic changes bilaterally similar to prior study. Old small left pontine infarct.     Partially visualized T2 hyperintense lesion in the superficial right parotid gland measuring approximately 13 x 10 mm, stable and nonspecific finding which may represent parotid neoplasm.     Mild mucosal thickening in maxillary sinuses, sphenoid sinuses, and ethmoid air cells. Minimal fluid in bilateral mastoid air cells.     Impression:      1. Scattered acute infarcts involving both cerebral hemispheres greater on the right with largest in the right parietal lobe. See above discussion.     2. Chronic infarcts and encephalomalacia bilaterally as discussed above. Stable scattered chronic microvascular ischemic changes.     3. Stable bifrontal craniotomy for meningioma resection. Stable ex vacuo enlargement of frontal horns without interval change in size or configuration of ventricular system. No significant extra-axial collections or midline shift.     4. Partially visualized T2 hyperintense lesion in the superficial right parotid lobe, seen previously and incompletely characterized, may represent parotid neoplasm.     5. Scattered inflammatory changes in paranasal sinuses.    Mri 2/25:  CONCLUSION:       1.  Significant dilatation of the lateral ventricles is noted.  This may be sequelae of normal pressure hydrocephalus.      2. Sequelae of bifrontal craniotomies is noted.  Volume loss in the right frontal lobe related to this is noted.  Small porencephalic cavity is noted.      3. Marked pachymeningeal enhancement throughout the supratentorial brain parenchyma may be sequelae of intracranial pressure changes.  This can also be seen with diffuse dural metastatic disease, however this is considered less likely.  This can also be   seen as sequelae of infection/inflammatory change.      4. There is a parenchymal enhancing lesion in the right parietal lobe.  This is involving the gyrus in this region.  This is of unknown etiology.  Possibility of malignancy is of consideration.  Possibility of localized cerebritis is of consideration as   well.  Correlation with prior outside MRI brain examination is that demonstrate the purpose of bilateral frontal craniotomies and previous resection of the right frontal lobe would be helpful.           Assessment/Plan:   Patient is a 59 year old male with PMH sig for hypertension, CHF, type 1 diabetes, status post right BKA history of seizures who was brought from nursing home for evaluation of seizures.      Breakthrough seizures  Status epilepticus  H/o R parietal CVA  H/o meningiomas s/p b/l craniotomies  - keppra bid  - continuous eeg with marked diffuse slowing  - MRI brain done at [facility] 10/24, appears to have had significant w/u done there. For some reason not showing in care everywhere/epic here, need to log-in to their system to see. Have cut pasted that MRI above to compare to the one done here. ?increased ventricular dilation here     Leukocytosis  Abnormal UA/pseudomonal UTI  Possible aspiration pneumonitis in the setting of seizures   lactic acidosis  -ID saw pt, stop cefepime and start levaquin through 3/1     Type 1 diabetes with hyperglycemia  - Uses pump at home however  pump is not present  - Basal bolus regimen, Accu-Cheks, ISS  - diabetes APRN following, continuing to make adjustments     Dnr/select     DVT Prophy: Tre Connors MD  [facility] Hospital[facility]  Pager: [phone]

## 2025-02-25 NOTE — PROGRESS NOTES
Infectious Disease Progress Note      Date of admission: 2/22/2025 10:29 AM     Reason for consult: Pseudomonal UTI    Referring physician: Paulina Wilkins MD    Subjective: Feels better.  No abdominal pain.  No nausea or vomiting.  No diarrhea.  No shortness of breath.  No cough or sputum production.    The rest of the systems were reviewed and found to be negative except was mentioned above    Interval events: This is a 59-year-old male patient, with history of seizure disorder, presenting here with multiple seizures.  UA with pyuria with urine culture now growing Pseudomonas, pan susceptible.  Currently on IV cefepime.    Medications:    levoFLOXacin    insulin aspart    insulin aspart    lacosamide    levETIRAcetam    acetaminophen    enoxaparin    melatonin    ondansetron    prochlorperazine    polyethylene glycol (PEG 3350)    sennosides    bisacodyl    fleet enema    glucose **OR** glucose **OR** glucose-vitamin C **OR** dextrose **OR** glucose **OR** glucose **OR** glucose-vitamin C    insulin degludec    LORazepam     Allergies:  Allergies[1]    Physical Exam:  Vitals:    02/25/25 0511   BP:    Pulse: 85   Resp: 11   Temp:      Vitals signs and nursing note reviewed.   Constitutional:       Appearance: Normal appearance.   HENT:      Head: Normocephalic and atraumatic.      Mouth: Mucous membranes are moist.   Neck:      Musculoskeletal: Neck supple.   Cardiovascular:      Rate and Rhythm: Normal rate.   Pulmonary:      Effort: Pulmonary effort is normal. No respiratory distress.   Abdominal:      General: Abdomen is flat. There is no distension.      Palpations: Abdomen is soft. There is no mass.      Tenderness: There is no tenderness. There is no guarding or rebound.      Hernia: No hernia is present.   Skin:     General: Skin is warm and dry.   Neurological:      General: No focal deficit present.      Mental Status: Alert and oriented to person, place, and time.       Laboratory data:  I have reviewed  all the lab results independently.      Recent Labs   Lab 02/23/25  0955   RBC 3.90*   HGB 10.7*   HCT 33.2*   MCV 85.1   MCH 27.4   MCHC 32.2   RDW 13.7   NEPRELIM 7.89*   WBC 12.1*   .0      Microbiology data:  Hospital Encounter on 02/22/25   1. Blood Culture     Status: None (Preliminary result)    Collection Time: 02/22/25 12:58 PM    Specimen: Blood,peripheral   Result Value Ref Range    Blood Culture Result No Growth 2 Days N/A   2. Urine Culture, Routine     Status: Abnormal    Collection Time: 02/22/25 11:04 AM    Specimen: Urine, clean catch   Result Value Ref Range    Urine Culture 10,000 - 50,000 CFU/ML Pseudomonas aeruginosa (A) N/A       Susceptibility    Pseudomonas aeruginosa -  (no method available)     Cefepime 4 Sensitive      Ceftazidime <=1 Sensitive      Ciprofloxacin* <=1 Sensitive       * The current CLSI susceptibility breakpoint for ciprofloxacin is an DIYA of 0.5 mcg/mL. MICs above this should NOT be considered susceptible. Updated susceptibility reporting is in progress.     Meropenem <=1 Sensitive      Levofloxacin 0.5 Sensitive      Piperacillin + Tazobactam <=4 Sensitive      Tobramycin <=1 Sensitive         Radiology:  I have reviewed all imagining data available independently.   Chest x-ray on 2/22:  Left basilar atelectasis/consolidation, question of aspiration     CT brain on 12/22/2025: No acute findings    Impression:  Janes Heart is a 59 year old male with    Pseudomonal UTI  Currently on cefepime  Cultures were reviewed  Question of aspiration pneumonia seen on the chest x-ray  The patient is otherwise not having any respiratory symptoms  Cefepime will cover  Leukocytosis  Reactive slightly worse  Will continue to trend  Status epilepticus  Management as per neurology  Reported allergy to Zosyn  Unknown reaction      Recommendations:    Discontinue cefepime and start Levaquin 750 mg daily to finish the previously planned course through 3/1/2025  Management of  seizures as per primary team  ID will sign off, please call us with any questions or changes status.  Thank you for this consultation.    The plan of care was discussed with the primary hospital team, Paulina Wilkins MD     Recommendations were also discussed with the patient; all questions were answered.     Thank you for this consultation. Please don't hesitate to call the ID team for questions or any acute changes in patient's clinical condition.    Please note that this report has been produced using speech recognition software and may contain errors related to that system including, but not limited to, errors in grammar, punctuation, and spelling, as well as words and phrases that possibly may have been recognized inappropriately.  If there are any questions or concerns, contact the dictating provider for clarification.    The 21st Century Cures Act makes medical notes like these available to patients in the interest of transparency. Please be advised this is a medical document. Medical documents are intended to carry relevant information, facts as evident, and the clinical opinion of the practitioner. The medical note is intended as peer to peer communication and may appear blunt or direct. It is written in medical language and may contain abbreviations or verbiage that are unfamiliar.     Cong Lion MD  [facility]. Tel: [phone]. Fax: [phone].     Janes ALFARO Sly : 1965 MRN: [id number] CSN: [id number]          [1]   Allergies  Allergen Reactions    Morphine UNKNOWN    Other UNKNOWN     Opioids listed on [facility] face sheet    Piperacillin UNKNOWN    Zosyn UNKNOWN

## 2025-02-25 NOTE — PROGRESS NOTES
AO x3. Seizure precautions. Legally blind. RA. Tele NSR. Lovenox. Primofit. Incontinent. Briefed. R KA. No pain reported. Bedbound. R CGM. Qid accuchecks. Cefepime. IV Keppra. Carb controlled diet. Pt resting in bed with call light in reach. No further needs. Plan for MRI of head.

## 2025-02-25 NOTE — PROGRESS NOTES
[facility]  THOR Neurology Progress Note    Janes Heart Patient Status:  Inpatient    1965 MRN [id number]   [facility] 5NW-A Attending Paulina Wilkins MD   Hosp Day # 3 PCP Julianne Zambrano MD     CC: seizure disorder    Subjective:  Patient seen for a follow up visit, reports feeling well, denies any headache or pain. No acute events over night reported. No tremors or jerking movements assessed.        MEDICATIONS:  No current outpatient medications on file.     Current Facility-Administered Medications   Medication Dose Route Frequency    levoFLOXacin (Levaquin) tab 750 mg  750 mg Oral Daily @ 0700    insulin aspart (NovoLOG) 100 Units/mL FlexPen 1-10 Units  1-10 Units Subcutaneous TID AC    insulin aspart (NovoLOG) 100 Units/mL FlexPen 1-5 Units  1-5 Units Subcutaneous TID CC and HS    lacosamide (Vimpat) tab 150 mg  150 mg Oral q12h    levETIRAcetam (Keppra) 500 mg/5mL injection 1,500 mg  1,500 mg Intravenous Q12H    acetaminophen (Tylenol Extra Strength) tab 500 mg  500 mg Oral Q4H PRN    enoxaparin (Lovenox) 40 MG/0.4ML SUBQ injection 40 mg  40 mg Subcutaneous Daily    melatonin tab 3 mg  3 mg Oral Nightly PRN    ondansetron (Zofran) 4 MG/2ML injection 4 mg  4 mg Intravenous Q6H PRN    prochlorperazine (Compazine) 10 MG/2ML injection 5 mg  5 mg Intravenous Q8H PRN    polyethylene glycol (PEG 3350) (Miralax) 17 g oral packet 17 g  17 g Oral Daily PRN    sennosides (Senokot) tab 17.2 mg  17.2 mg Oral Nightly PRN    bisacodyl (Dulcolax) 10 MG rectal suppository 10 mg  10 mg Rectal Daily PRN    fleet enema (Fleet) rectal enema 133 mL  1 enema Rectal Once PRN    glucose (Dex4) 15 GM/59ML oral liquid 15 g  15 g Oral Q15 Min PRN    Or    glucose (Glutose) 40% oral gel 15 g  15 g Oral Q15 Min PRN    Or    glucose-vitamin C (Dex-4) chewable tab 4 tablet  4 tablet Oral Q15 Min PRN    Or    dextrose 50% injection 50 mL  50 mL Intravenous Q15 Min PRN    Or    glucose (Dex4) 15 GM/59ML  oral liquid 30 g  30 g Oral Q15 Min PRN    Or    glucose (Glutose) 40% oral gel 30 g  30 g Oral Q15 Min PRN    Or    glucose-vitamin C (Dex-4) chewable tab 8 tablet  8 tablet Oral Q15 Min PRN    insulin degludec (Tresiba) 100 units/mL flextouch 12 Units  12 Units Subcutaneous Daily    LORazepam (Ativan) 2 mg/mL injection 1 mg  1 mg Intravenous Q6H PRN       REVIEW OF SYSTEMS:  A 10-point system was reviewed.  Pertinent positives and negatives are noted in HPI.      PHYSICAL EXAMINATION:  VITAL SIGNS: /83 (BP Location: Right arm)   Pulse 85   Temp 98 °F (36.7 °C) (Oral)   Resp 11   Ht 72\"   Wt 205 lb 0.4 oz (93 kg)   SpO2 100%   BMI 27.81 kg/m²   GENERAL:  Patient is a 59 year old male in no acute distress.  HEENT:  Normocephalic, atraumatic  ABD: Soft, non tender  SKIN: Warm, dry, no rashes    NEUROLOGICAL:   Mental status: Oriented to person, place, situation but no time. Follows simple commands  Speech: Fluent, some very mild dysarthria  Memory and comprehension: short-term memory impaired  Cranial Nerves: VFF, PERRL 3mm brisk, EOMI, no nystagmus, facial sensation intact, face symmetric, tongue midline, shoulder shrug equal  Motor: Left sided weakness with contractures as residual from hx of stroke no focal arm  weakness to the right, right BKA  Sensory: Intact to light touch bilaterally  Coordination: FTN intact on the right  Gait: Deferred       Imaging/Diagnostics:  MRI BRAIN (W+WO) (CPT=70553)    Result Date: 2/25/2025  CONCLUSION:   1. Significant dilatation of the lateral ventricles is noted.  This may be sequelae of normal pressure hydrocephalus.  2. Sequelae of bifrontal craniotomies is noted.  Volume loss in the right frontal lobe related to this is noted.  Small porencephalic cavity is noted.  3. Marked pachymeningeal enhancement throughout the supratentorial brain parenchyma may be sequelae of intracranial pressure changes.  This can also be seen with diffuse dural metastatic disease,  however this is considered less likely.  This can also be seen as sequelae of infection/inflammatory change.  4. There is a parenchymal enhancing lesion in the right parietal lobe.  This is involving the gyrus in this region.  This is of unknown etiology.  Possibility of malignancy is of consideration.  Possibility of localized cerebritis is of consideration as well.  Correlation with prior outside MRI brain examination is that demonstrate the purpose of bilateral frontal craniotomies and previous resection of the right frontal lobe would be helpful.    LOCATION:  [facility]    Dictated by (CST): Juan Jorgensen MD on 2/25/2025 at 8:19 AM     Finalized by (CST): Juan Jorgensen MD on 2/25/2025 at 8:32 AM       CT BRAIN OR HEAD (CPT=70450)    Result Date: 2/22/2025  CONCLUSION:  1. No acute intracranial hemorrhage. 2. Ventricular enlargement is out of proportion to cortical enlargement suggesting normal pressure hydrocephalus, correlate clinically. 3. Bilateral low-attenuation within the white matter suggests encephalomalacia from prior insults.  A superimposed acute infarct cannot be entirely excluded.  An MRI of the brain can be performed for further evaluation as clinically indicated. 4. If patient has prior studies of the brain, these would be helpful for further evaluation and to assess stability of the above findings.    LOCATION:  [facility]   Dictated by (CST): Gretchen Horn MD on 2/22/2025 at 11:46 AM     Finalized by (CST): Gretchen Horn MD on 2/22/2025 at 11:50 AM       XR CHEST AP PORTABLE  (CPT=71045)    Result Date: 2/22/2025  CONCLUSION:  Minimal left basilar opacity may be due to a developing consolidation or aspiration.   LOCATION:  [facility]      Dictated by (CST): Juan Jorgensen MD on 2/22/2025 at 11:46 AM     Finalized by (CST): Juan Jorgensen MD on 2/22/2025 at 11:47 AM          Labs:  Recent Labs   Lab 02/22/25  1104 02/23/25  0955   RBC 4.34 3.90*   HGB 11.8* 10.7*   HCT 36.1* 33.2*   MCV 83.2  85.1   MCH 27.2 27.4   MCHC 32.7 32.2   RDW 13.6 13.7   NEPRELIM 7.20 7.89*   WBC 11.3* 12.1*   .0 255.0         Recent Labs   Lab 02/22/25  1104 02/23/25  0955   * 129*   BUN 10 <5*   CREATSERUM 0.83 0.66*   EGFRCR 101 109   CA 8.9 8.8    146*   K 3.0* 3.6    109   CO2 30.0 30.0       Pre-morbid mRS 1-2      Assessment and Plan:    A 59 year old male with:      Status epilepticus. Patient was loaded in the emergency department with Keppra    - Continuous EEG monitoring showed evidence of marked diffuse slowing. No evidence of electrographic seizures were noted.   - Hx of craniotomy per chart review, and abnormal MRI results, Keppra was increased to 1.5 g twice daily as well as Vimpat to 150 mg twice daily.  - CT head didn't report any acute intracranial hemorrhage. Ventricular enlargement is out of proportion to cortical enlargement suggesting normal pressure hydrocephalus. Patient to be evaluated further once more stable.     2. Pt reports he has been diagnosed with brain tumor. Not much evidence on medical record.   - MRI of the brain reported a parenchymal enhancing lesion in the right parietal lobe. This is involving the gyrus in this region. Marked pachymeningeal enhancement throughout the supratentorial brain parenchyma may be sequelae of intracranial pressure changes. Significant dilatation of the lateral ventricles. Sequelae of bifrontal craniotomies. Volume loss in the right frontal lobe. Small porencephalic cavity.         Plan of care discussed with pt, all questions answered. Attempted to call pt  sister to get more information about pt neurologist, no answer, will re attempt tomorrow. Case discussed with nursing Dr. Nelson, further recommendations, if indicated to follow.          Is this a shared or split note between Advanced Practice Provider and Physician? Yes     Ema Youssef, LYDIA  [facility]  2/25/2025, 9:54 AM  [facility] # [id number]

## 2025-02-25 NOTE — PLAN OF CARE
Patient Aox2-3. Seizure precautions. IV keppra. Forgetful. Legally blind. , RA. NSR on tele, lovenox. Primofit and brief, incontinent. Left bunny boot. Left sided weakness. Right BKA. Carb control diet, 1:1 feed. Denies pain. Levaquin. Bed bound. Patient updated on POC, no further questions at this time.    Problem: Patient/Family Goals  Goal: Patient/Family Long Term Goal  Description: Patient's Long Term Goal: Discharge     Interventions:  - Follow plan of care   - See additional Care Plan goals for specific interventions  Outcome: Progressing  Goal: Patient/Family Short Term Goal  Description: Patient's Short Term Goal:   2/22 NOC: Control seizure   2/23  noc: seizure control  2/25: Monitor patient, wait for MRI results  Interventions:   - Anti convulsant meds, seizure precautions in place   - See additional Care Plan goals for specific interventions  Outcome: Progressing     Problem: SAFETY ADULT - FALL  Goal: Free from fall injury  Description: INTERVENTIONS:  - Assess pt frequently for physical needs  - Identify cognitive and physical deficits and behaviors that affect risk of falls.  - [place] fall precautions as indicated by assessment.  - Educate pt/family on patient safety including physical limitations  - Instruct pt to call for assistance with activity based on assessment  - Modify environment to reduce risk of injury  - Provide assistive devices as appropriate  - Consider OT/PT consult to assist with strengthening/mobility  - Encourage toileting schedule  Outcome: Progressing

## 2025-02-25 NOTE — PROGRESS NOTES
[facility]  Diabetes Consult Note    Janes Heart Patient Status:  Inpatient    1965 MRN [id number]   Location [facility] 5NW-A Attending Paulina Wilkins MD   Hosp Day # 3 PCP Julianne Zambrano MD     Reason for Consult:   Type 1 diabetes  ?Pump use at home    Provider Requesting Consult:  Adi Dawson DO       History of Present Illness: Janes Heart is a 59 year old male with PMHx hypertension, hyperlipidemia, CHF, type 1 diabetes, status post right BKA,  history of seizures  admitted 2025 for evaluation of breakthrough seizures.      Hx of DM1 diagnosed age 11. Reports several incidents of DKA. Previously wore omnipod insulin pump but hasn't in a while.  Lives at [place].  Strong fam hx of DM, brother dx'd age 55. Currently on regular diet but endorses poor appetite but really wants apple juice.       24 hour interval history:  - FBG this   - did have BG salina of 81 last night after mealtime fixed dose (4u w/ meals)   - Otherwise feeling well, had MRI early this AM, so a bit sleepy  - Appetite is good, hoping to eat some breakfast this AM     Diagnosis:  Patient Active Problem List   Diagnosis    Charcot foot due to diabetes mellitus (HCC)    Polyneuropathy in diabetes(357.2)    Midfoot collapse    Ulcer of other part of foot    Seizure disorder (HCC)    Aspiration pneumonia of left lung, unspecified aspiration pneumonia type, unspecified part of lung (HCC)         Medical History:  Past Medical History:    Anemia    Cancer (HCC)    Cardiomyopathy (HCC)    Congestive heart disease (HCC)    Esophageal reflux    Essential hypertension    Hyperlipidemia    Neuropathy    Renal disorder    Type I (juvenile type) diabetes mellitus without mention of complication, not stated as uncontrolled     Past Surgical History:   Procedure Laterality Date    Below knee leg amputation Right     Cataract      Other surgical history      tendon     Other surgical history      (L) hallux  amputation 2006    Other surgical history       Family History   Problem Relation Age of Onset    Cancer Neg     Diabetes Neg     Heart Disorder Neg     Hypertension Neg     Lipids Neg          Diabetes history:  Type:  1  Onset: age 11  Family history of DM: strong fam hx of DM1,       Allergies: Allergies[1]      Medications: Complete list reviewed. Active diabetes medications include tresiba/aspart.    PHYSICAL EXAM  Vitals:    02/25/25 0511   BP:    Pulse: 85   Resp: 11   Temp:      General Appearance:  alert, well developed, in no acute distress, closing eyes occasionally during encounter  Eyes:  normal conjunctivae, sclera  Respiratory:  breathing comfortably  Skin:  normal moisture and skin texture  MSK: R sided BKA     Labs:  Recent Labs   Lab 02/24/25  0549 02/24/25  1115 02/24/25  1610 02/24/25  2051 02/25/25  0451   PGLU 260* 140* 188* 90 171*     Recent Labs     02/22/25  1104 02/22/25  1554 02/23/25  0955 02/23/25  1102 02/24/25  0549     --  146*  --   --      --  109  --   --    CO2 30.0  --  30.0  --   --    BUN 10  --  <5*  --   --    CREATSERUM 0.83  --  0.66*  --   --    PGLU  --    < >  --    < > 260*   CA 8.9  --  8.8  --   --    ALB 3.8  --  3.6  --   --     < > = values in this interval not displayed.                  Assessment/Recommendations:  PMHX DM1 dx'd age 11,  with history of omnipod (pump) use but more recently managed at nursing home on MDI. Will continue present MDI management with plan to DC with MDI. Patient does not follow w/ an endocrinologist but instead sees an external PCP through [facility]. He feels comfortable returning DM management with his current provider.      Last PM had near-hypoglycemia after fixed dose meal coverage. Will replace fixed dose w/ ICR.     Plan:  Inpatient recommendations -   Degludec = continue 12u daily for now  Aspart = ISF 1:40>140 given low overnight.   Change fixed dose aspart 4u w/ meals --> start ICR 1:15 three times daily  AC  Discharge recommendations -   Start:  Tresiba = 12u every day   Novolog = 3u w/ meals + ISF 1:40>140  Wears dexcom G7 at home / nursing home    Prescription Recommendations:  Medicare:  Insulin:   Novolog, Fiasp, Apidra, Admelog, Levemir, Lantus, Basaglar, Tresiba, Toujeo  (If no secondary insurance, may need prior auth)  Supplies:  BD pen needles ([place]ois)    Provider follow up recommendations:  PCP   Endocrinology/[facility] - opts to continue management with PCP w/ JUSTIN Avila  2/25/2025         [1]   Allergies  Allergen Reactions    Morphine UNKNOWN    Other UNKNOWN     Opioids listed on radha face sheet    Piperacillin UNKNOWN    Zosyn UNKNOWN

## 2025-02-26 VITALS
HEART RATE: 66 BPM | OXYGEN SATURATION: 97 % | HEIGHT: 72 IN | TEMPERATURE: 98 F | BODY MASS INDEX: 27.77 KG/M2 | WEIGHT: 205 LBS | RESPIRATION RATE: 11 BRPM | SYSTOLIC BLOOD PRESSURE: 148 MMHG | DIASTOLIC BLOOD PRESSURE: 62 MMHG

## 2025-02-26 LAB
GLUCOSE BLD-MCNC: 138 MG/DL (ref 70–99)
GLUCOSE BLD-MCNC: 216 MG/DL (ref 70–99)
GLUCOSE BLD-MCNC: 338 MG/DL (ref 70–99)

## 2025-02-26 PROCEDURE — 99232 SBSQ HOSP IP/OBS MODERATE 35: CPT | Performed by: NURSE PRACTITIONER

## 2025-02-26 PROCEDURE — 99232 SBSQ HOSP IP/OBS MODERATE 35: CPT

## 2025-02-26 RX ORDER — LEVOFLOXACIN 750 MG/1
750 TABLET, FILM COATED ORAL DAILY
Qty: 3 TABLET | Refills: 0 | Status: SHIPPED | OUTPATIENT
Start: 2025-02-27 | End: 2025-03-02

## 2025-02-26 RX ORDER — LEVETIRACETAM 750 MG/1
1500 TABLET ORAL 2 TIMES DAILY
Qty: 60 TABLET | Refills: 1 | Status: SHIPPED | OUTPATIENT
Start: 2025-02-26 | End: 2025-02-26

## 2025-02-26 RX ORDER — LACOSAMIDE 150 MG/1
150 TABLET ORAL EVERY 12 HOURS
Qty: 60 TABLET | Refills: 2 | Status: SHIPPED | OUTPATIENT
Start: 2025-02-26

## 2025-02-26 RX ORDER — LEVETIRACETAM 750 MG/1
1500 TABLET ORAL 2 TIMES DAILY
Qty: 60 TABLET | Refills: 1 | Status: SHIPPED | OUTPATIENT
Start: 2025-02-26

## 2025-02-26 RX ORDER — INSULIN DEGLUDEC 100 U/ML
12 INJECTION, SOLUTION SUBCUTANEOUS DAILY
Qty: 3.6 ML | Refills: 0 | Status: SHIPPED | OUTPATIENT
Start: 2025-02-27 | End: 2025-03-29

## 2025-02-26 RX ORDER — LEVETIRACETAM 500 MG/1
1500 TABLET ORAL 2 TIMES DAILY
Status: DISCONTINUED | OUTPATIENT
Start: 2025-02-26 | End: 2025-02-26

## 2025-02-26 RX ORDER — LACOSAMIDE 150 MG/1
150 TABLET ORAL EVERY 12 HOURS
Qty: 60 TABLET | Refills: 2 | Status: SHIPPED | OUTPATIENT
Start: 2025-02-26 | End: 2025-02-26

## 2025-02-26 NOTE — PROGRESS NOTES
[facility]  THOR Neurology Progress Note    Janes Heart Patient Status:  Inpatient    1965 MRN [id number]   [facility] 5NW-A Attending Hiram Garcia, DO   Hosp Day # 4 PCP Julianne Zambrano MD     CC: seizure disorder    Subjective:  Pt seen for a follow up, no reports of headache, vision changes. No tremors or jerking movements noted.        MEDICATIONS:  No current outpatient medications on file.     Current Facility-Administered Medications   Medication Dose Route Frequency    levoFLOXacin (Levaquin) tab 750 mg  750 mg Oral Daily @ 0700    insulin aspart (NovoLOG) 100 Units/mL FlexPen 1-10 Units  1-10 Units Subcutaneous TID AC    sodium chloride (Saline Mist) 0.65 % nasal solution 1 spray  1 spray Each Nare Q3H PRN    insulin aspart (NovoLOG) 100 Units/mL FlexPen 1-5 Units  1-5 Units Subcutaneous TID CC and HS    lacosamide (Vimpat) tab 150 mg  150 mg Oral q12h    levETIRAcetam (Keppra) 500 mg/5mL injection 1,500 mg  1,500 mg Intravenous Q12H    acetaminophen (Tylenol Extra Strength) tab 500 mg  500 mg Oral Q4H PRN    enoxaparin (Lovenox) 40 MG/0.4ML SUBQ injection 40 mg  40 mg Subcutaneous Daily    melatonin tab 3 mg  3 mg Oral Nightly PRN    ondansetron (Zofran) 4 MG/2ML injection 4 mg  4 mg Intravenous Q6H PRN    prochlorperazine (Compazine) 10 MG/2ML injection 5 mg  5 mg Intravenous Q8H PRN    polyethylene glycol (PEG 3350) (Miralax) 17 g oral packet 17 g  17 g Oral Daily PRN    sennosides (Senokot) tab 17.2 mg  17.2 mg Oral Nightly PRN    bisacodyl (Dulcolax) 10 MG rectal suppository 10 mg  10 mg Rectal Daily PRN    fleet enema (Fleet) rectal enema 133 mL  1 enema Rectal Once PRN    glucose (Dex4) 15 GM/59ML oral liquid 15 g  15 g Oral Q15 Min PRN    Or    glucose (Glutose) 40% oral gel 15 g  15 g Oral Q15 Min PRN    Or    glucose-vitamin C (Dex-4) chewable tab 4 tablet  4 tablet Oral Q15 Min PRN    Or    dextrose 50% injection 50 mL  50 mL Intravenous Q15 Min PRN    Or     glucose (Dex4) 15 GM/59ML oral liquid 30 g  30 g Oral Q15 Min PRN    Or    glucose (Glutose) 40% oral gel 30 g  30 g Oral Q15 Min PRN    Or    glucose-vitamin C (Dex-4) chewable tab 8 tablet  8 tablet Oral Q15 Min PRN    insulin degludec (Tresiba) 100 units/mL flextouch 12 Units  12 Units Subcutaneous Daily    LORazepam (Ativan) 2 mg/mL injection 1 mg  1 mg Intravenous Q6H PRN       REVIEW OF SYSTEMS:  A 10-point system was reviewed.  Pertinent positives and negatives are noted in HPI.      PHYSICAL EXAMINATION:  VITAL SIGNS: /67 (BP Location: Right arm)   Pulse 66   Temp 98.1 °F (36.7 °C) (Oral)   Resp 11   Ht 72\"   Wt 205 lb 0.4 oz (93 kg)   SpO2 97%   BMI 27.81 kg/m²   GENERAL:  Patient is a 59 year old male in no acute distress.  HEENT:  Normocephalic, atraumatic  ABD: Soft, non tender  SKIN: Warm, dry, no rashes    NEUROLOGICAL:   Mental status: Oriented to person, place, situation but no time. Follows simple commands  Speech: Fluent, some very mild dysarthria  Memory and comprehension: short-term memory impaired  Cranial Nerves: VFF, PERRL 3mm brisk, EOMI, no nystagmus, facial sensation intact, face symmetric, tongue midline, shoulder shrug equal  Motor: Left sided weakness with contractures as residual from hx of stroke no focal arm  weakness to the right, right BKA  Sensory: Intact to light touch bilaterally  Coordination: FTN intact on the right  Gait: Deferred  Imaging/Diagnostics:  MRI BRAIN (W+WO) (CPT=70553)    Result Date: 2/25/2025  CONCLUSION:   1. Significant dilatation of the lateral ventricles is noted.  This may be sequelae of normal pressure hydrocephalus.  2. Sequelae of bifrontal craniotomies is noted.  Volume loss in the right frontal lobe related to this is noted.  Small porencephalic cavity is noted.  3. Marked pachymeningeal enhancement throughout the supratentorial brain parenchyma may be sequelae of intracranial pressure changes.  This can also be seen with diffuse dural  metastatic disease, however this is considered less likely.  This can also be seen as sequelae of infection/inflammatory change.  4. There is a parenchymal enhancing lesion in the right parietal lobe.  This is involving the gyrus in this region.  This is of unknown etiology.  Possibility of malignancy is of consideration.  Possibility of localized cerebritis is of consideration as well.  Correlation with prior outside MRI brain examination is that demonstrate the purpose of bilateral frontal craniotomies and previous resection of the right frontal lobe would be helpful.    LOCATION:  [facility]    Dictated by (CST): Juan Jorgensen MD on 2/25/2025 at 8:19 AM     Finalized by (CST): Juan Jorgensen MD on 2/25/2025 at 8:32 AM       CT BRAIN OR HEAD (CPT=70450)    Result Date: 2/22/2025  CONCLUSION:  1. No acute intracranial hemorrhage. 2. Ventricular enlargement is out of proportion to cortical enlargement suggesting normal pressure hydrocephalus, correlate clinically. 3. Bilateral low-attenuation within the white matter suggests encephalomalacia from prior insults.  A superimposed acute infarct cannot be entirely excluded.  An MRI of the brain can be performed for further evaluation as clinically indicated. 4. If patient has prior studies of the brain, these would be helpful for further evaluation and to assess stability of the above findings.    LOCATION:  [facility]   Dictated by (CST): Gretchen Horn MD on 2/22/2025 at 11:46 AM     Finalized by (CST): Gretchen Horn MD on 2/22/2025 at 11:50 AM       XR CHEST AP PORTABLE  (CPT=71045)    Result Date: 2/22/2025  CONCLUSION:  Minimal left basilar opacity may be due to a developing consolidation or aspiration.   LOCATION:  [facility]      Dictated by (CST): Juan Jorgensen MD on 2/22/2025 at 11:46 AM     Finalized by (CST): Juan Jorgensen MD on 2/22/2025 at 11:47 AM          Labs:  Recent Labs   Lab 02/22/25  1104 02/23/25  0955   RBC 4.34 3.90*   HGB 11.8* 10.7*   HCT 36.1*  33.2*   MCV 83.2 85.1   MCH 27.2 27.4   MCHC 32.7 32.2   RDW 13.6 13.7   NEPRELIM 7.20 7.89*   WBC 11.3* 12.1*   .0 255.0         Recent Labs   Lab 02/22/25  1104 02/23/25  0955   * 129*   BUN 10 <5*   CREATSERUM 0.83 0.66*   EGFRCR 101 109   CA 8.9 8.8    146*   K 3.0* 3.6    109   CO2 30.0 30.0     Pre-morbid mRS 1-2        Assessment and Plan:     A 59 year old male with:      Status epilepticus. Patient was loaded in the emergency department with Keppra    - Continuous EEG monitoring showed evidence of marked diffuse slowing. No evidence of electrographic seizures were noted.   - Hx of bilateral craniotomies per chart review, and abnormal MRI results, Keppra was increased to 1.5 g twice daily as well as Vimpat to 150 mg twice daily. To continue.  - CT head didn't report any acute intracranial hemorrhage. Ventricular enlargement is out of proportion to cortical enlargement suggesting normal pressure hydrocephalus. Patient to be evaluated further once more stable.      2. Pt reports he has been diagnosed with brain tumor. Not much evidence on medical record. MRI of the brain for further evaluation.  - Hospitalist able to get into [facility] system, review records and it appears pt had significant work up done there.   - History of meningiomas,  s/p bilateral craniotomies, following Dr. Haleigh Amanda (neurologist) and Dr. Jessy Alejandre (neurosurgery).  - MRI brain impression from [facility] on 10/24: Scattered acute infarcts involving both cerebral hemispheres greater on the right with largest in the right parietal lobe. See above discussion. Chronic infarcts and encephalomalacia bilaterally as discussed above. Stable scattered chronic microvascular ischemic changes. Stable bifrontal craniotomy for meningioma resection. Stable ex vacuo enlargement of frontal horns without interval change in size or configuration of ventricular system. No significant extra-axial collections or midline shift. Partially  visualized T2 hyperintense lesion in the superficial right parotid lobe, seen previously and incompletely characterized, may represent parotid neoplasm. Scattered inflammatory changes in paranasal sinuses.  -02/25 MRI of the brain, at this hospital reported a parenchymal enhancing lesion in the right parietal lobe.This is involving the gyrus in this region. Marked pachymeningeal enhancement throughout the supratentorial brain parenchyma may be sequelae of intracranial pressure changes. Significant dilatation of the lateral ventricles. Sequelae of bifrontal craniotomies. Volume loss in the right frontal lobe. Small porencephalic cavity.       No further inpatient neuro work up at this time, will have pt follow up with established neurologist as outpatient. Plan of care discussed with pt and sister, Do, with verbal understanding. Case discussed with nursing and Dr. Nelson, further recommendations if indicated to follow. Please feel free to call with any neuro changes or concerns.      Is this a shared or split note between Advanced Practice Provider and Physician? Yes     LYDIA Olsen  [facility]  2/26/2025, 9:40 AM  [facility] # [id number]

## 2025-02-26 NOTE — DISCHARGE SUMMARY
[facility] Discharge Summary    Patient ID  Janes Heart  [id number]  59 year old  7/22/1965    Admit date: 2/22/2025    Discharge date:  02/26/25    Attending: Hiram Garcia DO     Primary Care Physician: Julianne Zambrano MD      Reason for admission: seizure    Discharge condition: stable    Disposition: naveen    Important follow up:   -PCP within 7 d  -specialists:  primary neurologist within a week    -labs:    -radiology:      Additional patient instructions       Discharge med list     Medication List        ASK your doctor about these medications      acetaminophen 500 MG Tabs  Commonly known as: Tylenol Extra Strength     Glutose 15 40% Gel  Generic drug: glucose     Gvoke HypoPen 1-Pack 1 MG/0.2ML SUBQ injection  Generic drug: glucagon     * insulin aspart 100 Units/mL Sopn  Commonly known as: NovoLOG  Ask about: Which instructions should I use?     * insulin aspart 100 Units/mL Sopn  Commonly known as: NovoLOG  Ask about: Which instructions should I use?     Lantus SoloStar 100 UNIT/ML Sopn  Generic drug: insulin glargine  Ask about: Which instructions should I use?     levetiracetam 1000 MG Tabs  Commonly known as: KEPPRA     lidocaine 4 % Ptch  Commonly known as: LIDODERM     * LORazepam Intensol 2 mg/mL Conc     * LORazepam Intensol 2 mg/mL Conc     omeprazole 20 MG Cpdr  Commonly known as: PriLOSEC     OPTIFOAM GENTLE AG DRESSING EX     Santyl 250 UNIT/GM Oint  Generic drug: collagenase     Vimpat 100 MG Tabs  Generic drug: lacosamide           * This list has 4 medication(s) that are the same as other medications prescribed for you. Read the directions carefully, and ask your doctor or other care provider to review them with you.                  Discharge Diagnoses:  Breakthrough seizures  Status epilepticus  H/o R parietal CVA  H/o meningiomas s/p b/l craniotomies  Leukocytosis  Abnormal UA/pseudomonal UTI  Possible aspiration pneumonitis in the setting of seizures   Type 1 diabetes  with hyperglycemia   Consults:  IP CONSULT TO HOSPITALIST  IP CONSULT TO NEUROLOGY  IP CONSULT TO DIABETES APRN/PA  IP CONSULT TO INFECTIOUS DISEASE    Radiology:  MRI BRAIN (W+WO) (CPT=70553)    Result Date: 2/25/2025  PROCEDURE:  MRI BRAIN (W+WO) (CPT=70553)  COMPARISON:  [facility] , CT, CT BRAIN OR HEAD (76932), 2/22/2025, 11:28 AM.  INDICATIONS:  mass  TECHNIQUE:  MRI of the brain was performed with multi-planar T1, T2-weighted images with FLAIR sequences and diffusion weighted images without and with infusion.  PATIENT STATED HISTORY:(As transcribed by Technologist)  Mass   CONTRAST USED:  30 mL of Dotarem  FINDINGS:   Dilatation of the lateral ventricles is again noted.   Marked FLAIR abnormalities in bilateral frontal lobes along with posterior right temporal/occipital lobe is noted.   The basal cisterns are patent.   The craniocervical junction is unremarkable.   Volume loss in the margins of the anterior body of the corpus callosum is noted.  Corpus callosum is thin.  Optic chiasm and cerebellar tonsils are unremarkable.  Tiny punctate regions of susceptibility in the occipital horns bilaterally is noted.  This may be sequelae of chronic hemorrhagic products.   Right parietal gyral enhancing lesion measures 1.4 x 0.9 cm in the axial plane and 1.2 cm cranio caudally.   Marked dural/pachymeningeal enhancement surrounding the supratentorial brain parenchyma is noted.  Sequelae of right frontal craniotomy is noted.  Adjacent volume loss in the right frontal lobe is noted.  Enhancement within the right frontal craniotomy may be due to granulation tissue.  There is no restricted diffusion to suggest acute ischemia/infarction.  The visualized paranasal sinuses and mastoid air cells are unremarkable.   The expected major intracranial flow voids are present.  Cyst in the right parotid gland measures 1.4 x 1.3 cm.  Sequelae of left frontal craniotomy is also noted.             CONCLUSION:   1. Significant dilatation of  the lateral ventricles is noted.  This may be sequelae of normal pressure hydrocephalus.  2. Sequelae of bifrontal craniotomies is noted.  Volume loss in the right frontal lobe related to this is noted.  Small porencephalic cavity is noted.  3. Marked pachymeningeal enhancement throughout the supratentorial brain parenchyma may be sequelae of intracranial pressure changes.  This can also be seen with diffuse dural metastatic disease, however this is considered less likely.  This can also be seen as sequelae of infection/inflammatory change.  4. There is a parenchymal enhancing lesion in the right parietal lobe.  This is involving the gyrus in this region.  This is of unknown etiology.  Possibility of malignancy is of consideration.  Possibility of localized cerebritis is of consideration as well.  Correlation with prior outside MRI brain examination is that demonstrate the purpose of bilateral frontal craniotomies and previous resection of the right frontal lobe would be helpful.    LOCATION:  [facility]    Dictated by (CST): Juan Jorgensen MD on 2/25/2025 at 8:19 AM     Finalized by (CST): Juan Jorgensen MD on 2/25/2025 at 8:32 AM       CT BRAIN OR HEAD (CPT=70450)    Result Date: 2/22/2025  PROCEDURE:  CT BRAIN OR HEAD (66117)  COMPARISON:  None.  INDICATIONS:  actively seizing while coming in to ER  TECHNIQUE:  Noncontrast CT scanning is performed through the brain. Dose reduction techniques were used. Dose information is transmitted to the ACR (American College of Radiology) NRDR (National Radiology Data Registry) which includes the Dose Index Registry.  PATIENT STATED HISTORY: (As transcribed by Technologist)  Patient actively seizing while coming in to ER.    FINDINGS:  VENTRICLES/SULCI:  Ventricles and sulci are enlarged with ventricular enlargement out of proportion to cortical prominence suggesting normal pressure hydrocephalus.  INTRACRANIAL:  There are no abnormal extraaxial fluid collections.  There  is no midline shift.  There is no acute intracranial hemorrhage.  Bifrontal periventricular low attenuation as well as similar density within the right posterior parietal occipital  lobe suggests prior vascular insults.  SINUSES:           No sign of acute sinusitis.  1.3 cm polyp/mucous retention cyst within the inferior left maxillary sinus. MASTOIDS:          No sign of acute inflammation. SKULL:             Bilateral craniotomy. OTHER:             Multiple punctate calcifications throughout the left parotid gland, correlate clinically.            CONCLUSION:  1. No acute intracranial hemorrhage. 2. Ventricular enlargement is out of proportion to cortical enlargement suggesting normal pressure hydrocephalus, correlate clinically. 3. Bilateral low-attenuation within the white matter suggests encephalomalacia from prior insults.  A superimposed acute infarct cannot be entirely excluded.  An MRI of the brain can be performed for further evaluation as clinically indicated. 4. If patient has prior studies of the brain, these would be helpful for further evaluation and to assess stability of the above findings.    LOCATION:  [facility]   Dictated by (CST): Gretchen Horn MD on 2/22/2025 at 11:46 AM     Finalized by (CST): Gretchen Horn MD on 2/22/2025 at 11:50 AM       XR CHEST AP PORTABLE  (CPT=71045)    Result Date: 2/22/2025  PROCEDURE:  XR CHEST AP PORTABLE  (CPT=71045)  TECHNIQUE:  AP chest radiograph was obtained.  COMPARISON:  None.  INDICATIONS:  actively seizing while coming in to ER  PATIENT STATED HISTORY: (As transcribed by Technologist)  Patient offered no additional information at this time.    FINDINGS:   Cardiac silhouette pulmonary vasculature are unremarkable.  Minimal left basilar opacity.  No pneumothorax.            CONCLUSION:  Minimal left basilar opacity may be due to a developing consolidation or aspiration.   LOCATION:  [facility]      Dictated by (CST): Juan Jorgensen MD on 2/22/2025 at 11:46 AM      Finalized by (CST): Juan Jorgensen MD on 2/22/2025 at 11:47 AM         Operative reports:      Hospital course:  Patient is a 59 year old male with PMH sig for hypertension, CHF, type 1 diabetes, status post right BKA history of seizures who was brought from nursing home for evaluation of seizures.      Breakthrough seizures  Status epilepticus  H/o R parietal CVA  H/o meningiomas s/p b/l craniotomies  - keppra and vimpat increased per neuro  - continuous eeg with marked diffuse slowing  - MRI brain done at [facility] 10/24, appears to have had significant w/u done there. Pt understand the need to fu there after dc     Leukocytosis  Abnormal UA/pseudomonal UTI  Possible aspiration pneumonitis in the setting of seizures   lactic acidosis  -ID saw pt, stop cefepime and start levaquin through 3/1     Type 1 diabetes with hyperglycemia  - Uses pump at home however pump is not present  - Basal bolus regimen, Accu-Cheks, ISS  - diabetes APRN following, continuing to make adjustments    Day of discharge exam:  Vitals:    02/26/25 0745   BP: 150/67   Pulse: 66   Resp: 11   Temp: 98.1 °F (36.7 °C)     Asking when he can go home  He is aware he is at [facility]. Reports he is seeing neuro at [facility] and acknowledged that he will fu with their dept after discharge    Dw RN, pt was cleared for dc by neuro / ID    No acute distress, alert and oriented   Lungs clear  Heart regular  Abdomen benign    Total time coordinating care  35 min      Patient and/or family had opportunity to ask questions and expressed understanding and agreement with therapeutic plan as outlined         Hiram Reyes Hospitalist  [phone]  Answering Service: [phone]

## 2025-02-26 NOTE — PLAN OF CARE
2/26 alert, legally blind, confused. Able to follow directions. No seizure nor tremors noted. Seen by neuro, signed off. Ok to dc back to [place] .ID signed off yesterday. Hospitalist notifed, ok to dc back to [place]. Report given to radha rn, spoke to saadia riggins. Sister Do notified of dc back to [facility]    Problem: Patient/Family Goals  Goal: Patient/Family Long Term Goal  Description: Patient's Long Term Goal: Discharge     Interventions:  - Follow plan of care   - See additional Care Plan goals for specific interventions  Outcome: Progressing  Goal: Patient/Family Short Term Goal  Description: Patient's Short Term Goal:   2/22 NOC: Control seizure   2/23  noc: seizure control  2/25: Monitor patient, wait for MRI results  Interventions:   - Anti convulsant meds, seizure precautions in place   - See additional Care Plan goals for specific interventions  Outcome: Progressing     Problem: SAFETY ADULT - FALL  Goal: Free from fall injury  Description: INTERVENTIONS:  - Assess pt frequently for physical needs  - Identify cognitive and physical deficits and behaviors that affect risk of falls.  - [place] fall precautions as indicated by assessment.  - Educate pt/family on patient safety including physical limitations  - Instruct pt to call for assistance with activity based on assessment  - Modify environment to reduce risk of injury  - Provide assistive devices as appropriate  - Consider OT/PT consult to assist with strengthening/mobility  - Encourage toileting schedule  Outcome: Progressing

## 2025-02-26 NOTE — DISCHARGE INSTRUCTIONS
Discharge recommendations -for insulin   Start:  Tresiba = 12u every morning   Novolog = 3 units + ISF 1:40>140 3 x a day before meals   Adjust PRN as outpatient      Follow up with primary neurologist within a week at [facility]              TREATING LOW BLOOD SUGAR: Hypoglycemia  Low blood sugar= Less than 70    How to treat a low blood sugar if you are able to eat/drink: The Rule of 15  If using continuous glucose monitor, verify on fingerstick that your blood sugar is actually low before treating.   Eat 15 grams of carbs (see examples below)  Check your blood sugar after 15 minutes. If it’s still below your target range, have another serving.   Repeat these steps until it’s in your target range. Once it’s in range, if you're nervous about your sugar going low again, have a protein source (ie, a spoonful of peanut butter).     EASY, PRE-PORTIONED treatment for low blood sugars that are 15G of carbs:   - Children sized fruit snack pack- look for one with 15 grams of total carbohydrate  - Children sized squeeze pouch applesauce  - Small children's sized juicebox- 15g carb  - Glucose tablets from [employer], you can find them near diabetes supplies --> Note, you will need to eat 3-4 to get to 15g of carbs  - 3-4 Starburst candies  - 1 pack of fun sized skittles

## 2025-02-26 NOTE — CM/SW NOTE
Sent updates to Mian Longo [place] on aidin.     SW/CM to remain available for support and/or discharge planning.    NICANOR Bustamante  Discharge Planner

## 2025-02-26 NOTE — CM/SW NOTE
02/26/25 1300   Discharge disposition   Expected discharge disposition [facility]   Post Acute Care Provider Mian Parker   Discharge transportation [facility]     Informed by RN that patient is medically cleared for discharge. Spoke with Trcaey from [facility] who confirmed bed available today. Spoke with [facility] and scheduled  for 5pm today. PCS form completed and available for RN to print. RN updated sister Do about discharge. LVM with her as well. SW will remain available.      [facility]  Phone: [phone]      [facility]  [phone] or [id number]      NICANOR Ortega  Discharge Planner  [phone]

## 2025-02-26 NOTE — PROGRESS NOTES
[facility]  Diabetes Follow up Note    Janes Heart Patient Status:  Inpatient    1965 MRN [id number]   Location [facility] 5NW-A Attending Paulina Wilkins MD   Hosp Day # 4 PCP Julianne Zambrano MD     Reason for Consult: Type 1 diabetes (?Pump use at home)  Date of initial consult:  25    Reason for Hospitalization: Seizures  Date of Admission: 25    Provider Requesting Consult:  Adi Dawson DO     History of Present Illness: Janes Heart is a 59 year old male with PMHx hypertension, hyperlipidemia, CHF, type 1 diabetes, status post right BKA,  history of seizures  admitted 2025 for evaluation of breakthrough seizures.      25 :   Currently on regular diet but endorses poor appetite but really wants apple juice.   - FBG this   - did have BG salina of 81 last night after mealtime fixed dose (4u w/ meals)   - Otherwise feeling well, had MRI early this AM, so a bit sleepy  - Appetite is good, hoping to eat some breakfast this AM     Interim -25:    Patient lives at [facility]. This morning his . He had his breakfast and ate toast and his apple juice. He states he has not had his BM yet. He states he did not like the eggs and sausage. States has no problem with his food menu.     Diagnosis:  Patient Active Problem List   Diagnosis    Charcot foot due to diabetes mellitus (HCC)    Polyneuropathy in diabetes(357.2)    Midfoot collapse    Ulcer of other part of foot    Seizure disorder (HCC)    Aspiration pneumonia of left lung, unspecified aspiration pneumonia type, unspecified part of lung (HCC)         Medical History:  Past Medical History:    Anemia    Cancer (HCC)    Cardiomyopathy (HCC)    Congestive heart disease (HCC)    Esophageal reflux    Essential hypertension    Hyperlipidemia    Neuropathy    Renal disorder    Type I (juvenile type) diabetes mellitus without mention of complication, not stated as uncontrolled     Past Surgical History:    Procedure Laterality Date    Below knee leg amputation Right     Cataract      Other surgical history      tendon 2006    Other surgical history      (L) hallux amputation 2006    Other surgical history       Family History   Problem Relation Age of Onset    Cancer Neg     Diabetes Neg     Heart Disorder Neg     Hypertension Neg     Lipids Neg          Diabetes history:  Type:  1  Onset: age 11  Mgmt: PCP in [facility], noted in epic, he used to see Endocrinology in [facility] in 2017   Family history of DM: strong fam hx of DM1- his brother at age 55  Treatment: with history of omnipod (pump) use but more recently managed at nursing home on MDI- basal bolus insulin   Testing: Dexcom, he is wearing it right now but his  and phone is at [facility]   Complications: CHF, RBKA, reports several incidents of DKA.       Allergies: Allergies[1]      Medications: Complete list reviewed. Active diabetes medications include tresiba/aspart.    PHYSICAL EXAM  Vitals:    02/26/25 0745   BP: 150/67   Pulse: 66   Resp: 11   Temp: 98.1 °F (36.7 °C)     General Appearance:  alert, well developed, in no acute distress, closing eyes occasionally during encounter  Eyes:  normal conjunctivae, sclera  Respiratory:  breathing comfortably in room air   Skin:  normal moisture and skin texture  MSK: R sided BKA     Labs:  Recent Labs   Lab 02/25/25  1330 02/25/25  1533 02/25/25  1747 02/25/25  2123 02/26/25  0455   PGLU 380* 277* 200* 243* 138*     Recent Labs     02/22/25  1104 02/22/25  1554 02/23/25  0955 02/23/25  1102 02/24/25  0549     --  146*  --   --      --  109  --   --    CO2 30.0  --  30.0  --   --    BUN 10  --  <5*  --   --    CREATSERUM 0.83  --  0.66*  --   --    PGLU  --    < >  --    < > 260*   CA 8.9  --  8.8  --   --    ALB 3.8  --  3.6  --   --     < > = values in this interval not displayed.               Assessment/Recommendations:    #Diabetes on MDI ( basal- bolus insulin) 2/25/25 : PMHX DM1  dx'd age 11,  with history of omnipod (pump) use but more recently managed at nursing home on MDI. Will continue present MDI management with plan to DC with MDI. Patient does not follow w/ an endocrinologist but instead sees an external PCP through [facility]. He feels comfortable returning DM management with his current provider.    Last PM had near-hypoglycemia after fixed dose meal coverage. Will replace fixed dose w/ ICR.     2/26/25: Noted patient received total of 18 units of aspart and 12 units of degludec yesterday. FBG today 138. BG range in the last 24 hrs 138-380. To continue getting apple juice only with meals. Discussed to him importance of having balance meals to avoid spike up of glucose 2 hrs after meals.     Plan:  Inpatient recommendations -   Degludec = continue 12u daily for now  Aspart   Continue  ICR 1:15 three + ISF 1:40>140 3 x a day before meals  and ISF nightly   Diet: 60 g of carbs per big meals three times a day   Accucheck QID, Hypoglycemia protocol to follow     Discharge recommendations -   Start:  Tresiba = 12u daily   Novolog = 3u w/ meals + ISF 1:40>140  Wears dexcom G7 at [facility]( his phone/ in the facility) and will go home with MDI  / nursing home    Prescription Recommendations:  Medicare:  Insulin:   Novolog, Fiasp, Apidra, Admelog, Levemir, Lantus, Basaglar, Tresiba, Toujeo  (If no secondary insurance, may need prior auth)  Supplies:  BD pen needles ([facility]ois)    Provider follow up recommendations:  PCP   [facility] - opts to continue management with PCP w/ [facility]      #Seizure  - followed by neurology, waiting for their plan  # Pseudomonas UTI  - ID sign off last 2/25      LYDIA Neri, FNP-BC  [facility] Endocrinology  02/26/25           [1]   Allergies  Allergen Reactions    Morphine UNKNOWN    Other UNKNOWN     Opioids listed on [place] face sheet    Piperacillin UNKNOWN    Zosyn UNKNOWN

## 2025-02-26 NOTE — PROGRESS NOTES
AO x3. Seizure precautions. Legally blind. RA. Tele NSR. Lovenox. Primofit. Incontinent. Briefed. R KA. No pain reported. Bedbound. Qid accuchecks. PO Levaquin. IV Keppra. Carb controlled diet. Pt resting in bed with call light in reach. No further needs.

## 2025-02-27 LAB
GLUCOSE BLD-MCNC: 100 MG/DL (ref 70–99)
GLUCOSE BLD-MCNC: 125 MG/DL (ref 70–99)

## 2025-04-01 ENCOUNTER — APPOINTMENT (OUTPATIENT)
Dept: GENERAL RADIOLOGY | Facility: HOSPITAL | Age: 60
End: 2025-04-01
Attending: EMERGENCY MEDICINE
Payer: MEDICARE

## 2025-04-01 ENCOUNTER — APPOINTMENT (OUTPATIENT)
Dept: CT IMAGING | Facility: HOSPITAL | Age: 60
End: 2025-04-01
Attending: EMERGENCY MEDICINE
Payer: MEDICARE

## 2025-04-01 ENCOUNTER — HOSPITAL ENCOUNTER (EMERGENCY)
Facility: HOSPITAL | Age: 60
Discharge: HOME | End: 2025-04-01
Attending: EMERGENCY MEDICINE
Payer: MEDICARE

## 2025-04-01 VITALS
OXYGEN SATURATION: 100 % | RESPIRATION RATE: 18 BRPM | SYSTOLIC BLOOD PRESSURE: 134 MMHG | TEMPERATURE: 97 F | HEART RATE: 73 BPM | DIASTOLIC BLOOD PRESSURE: 69 MMHG

## 2025-04-01 DIAGNOSIS — E10.65 TYPE 1 DIABETES MELLITUS WITH HYPERGLYCEMIA (HCC): ICD-10-CM

## 2025-04-01 DIAGNOSIS — G40.909 SEIZURE DISORDER (HCC): Primary | ICD-10-CM

## 2025-04-01 LAB
ALBUMIN SERPL-MCNC: 4.3 G/DL (ref 3.2–4.8)
ALBUMIN/GLOB SERPL: 1.7 {RATIO} (ref 1–2)
ALP LIVER SERPL-CCNC: 138 U/L
ALT SERPL-CCNC: 21 U/L
ANION GAP SERPL CALC-SCNC: 9 MMOL/L (ref 0–18)
AST SERPL-CCNC: 19 U/L (ref ?–34)
BASOPHILS # BLD AUTO: 0.14 X10(3) UL (ref 0–0.2)
BASOPHILS NFR BLD AUTO: 1.4 %
BILIRUB SERPL-MCNC: 0.4 MG/DL (ref 0.3–1.2)
BUN BLD-MCNC: 21 MG/DL (ref 9–23)
CALCIUM BLD-MCNC: 10.1 MG/DL (ref 8.7–10.6)
CHLORIDE SERPL-SCNC: 105 MMOL/L (ref 98–112)
CO2 SERPL-SCNC: 28 MMOL/L (ref 21–32)
CREAT BLD-MCNC: 1.14 MG/DL
EGFRCR SERPLBLD CKD-EPI 2021: 75 ML/MIN/1.73M2 (ref 60–?)
EOSINOPHIL # BLD AUTO: 1.05 X10(3) UL (ref 0–0.7)
EOSINOPHIL NFR BLD AUTO: 10.5 %
ERYTHROCYTE [DISTWIDTH] IN BLOOD BY AUTOMATED COUNT: 14.4 %
GLOBULIN PLAS-MCNC: 2.6 G/DL (ref 2–3.5)
GLUCOSE BLD-MCNC: 310 MG/DL (ref 70–99)
HCT VFR BLD AUTO: 38.9 %
HGB BLD-MCNC: 12.6 G/DL
IMM GRANULOCYTES # BLD AUTO: 0.07 X10(3) UL (ref 0–1)
IMM GRANULOCYTES NFR BLD: 0.7 %
LYMPHOCYTES # BLD AUTO: 2.29 X10(3) UL (ref 1–4)
LYMPHOCYTES NFR BLD AUTO: 23 %
MCH RBC QN AUTO: 27.5 PG (ref 26–34)
MCHC RBC AUTO-ENTMCNC: 32.4 G/DL (ref 31–37)
MCV RBC AUTO: 84.9 FL
MONOCYTES # BLD AUTO: 0.53 X10(3) UL (ref 0.1–1)
MONOCYTES NFR BLD AUTO: 5.3 %
NEUTROPHILS # BLD AUTO: 5.89 X10 (3) UL (ref 1.5–7.7)
NEUTROPHILS # BLD AUTO: 5.89 X10(3) UL (ref 1.5–7.7)
NEUTROPHILS NFR BLD AUTO: 59.1 %
OSMOLALITY SERPL CALC.SUM OF ELEC: 309 MOSM/KG (ref 275–295)
PLATELET # BLD AUTO: 402 10(3)UL (ref 150–450)
POTASSIUM SERPL-SCNC: 3.9 MMOL/L (ref 3.5–5.1)
PROT SERPL-MCNC: 6.9 G/DL (ref 5.7–8.2)
RBC # BLD AUTO: 4.58 X10(6)UL
SODIUM SERPL-SCNC: 142 MMOL/L (ref 136–145)
WBC # BLD AUTO: 10 X10(3) UL (ref 4–11)

## 2025-04-01 PROCEDURE — 96360 HYDRATION IV INFUSION INIT: CPT

## 2025-04-01 PROCEDURE — 80053 COMPREHEN METABOLIC PANEL: CPT | Performed by: EMERGENCY MEDICINE

## 2025-04-01 PROCEDURE — 93010 ELECTROCARDIOGRAM REPORT: CPT

## 2025-04-01 PROCEDURE — 93005 ELECTROCARDIOGRAM TRACING: CPT

## 2025-04-01 PROCEDURE — 99285 EMERGENCY DEPT VISIT HI MDM: CPT

## 2025-04-01 PROCEDURE — 70450 CT HEAD/BRAIN W/O DYE: CPT | Performed by: EMERGENCY MEDICINE

## 2025-04-01 PROCEDURE — 85025 COMPLETE CBC W/AUTO DIFF WBC: CPT | Performed by: EMERGENCY MEDICINE

## 2025-04-01 PROCEDURE — 71045 X-RAY EXAM CHEST 1 VIEW: CPT | Performed by: EMERGENCY MEDICINE

## 2025-04-01 RX ORDER — INSULIN ASPART 100 [IU]/ML
0.15 INJECTION, SOLUTION INTRAVENOUS; SUBCUTANEOUS ONCE
Status: COMPLETED | OUTPATIENT
Start: 2025-04-01 | End: 2025-04-01

## 2025-04-01 NOTE — ED PROVIDER NOTES
Patient Seen in: [facility] Emergency Department      History     Chief Complaint   Patient presents with    Seizure Disorder     Stated Complaint: From [facility] with seizure like activity. Postictal/ unresponsive at the scene. A/*    Subjective:   HPI      59-year-old male who is a type I diabetic with a known history of seizures presents to the emergency department after having a probable seizure.  Patient is from [facility].  They were finishing rehab when he \"rolled his eyes in the back of his head\" and exhibited seizure-like activity.  He had area being postictal.  On arrival to the emergency department patient is able to answer questions but initially was resistant to getting anything done.  He denies any recollection of the events.  He states that he has some back pain which is chronic but no new injuries.    Objective:     No pertinent past medical history.            No pertinent past surgical history.              No pertinent social history.                Physical Exam     ED Triage Vitals   BP 04/01/25 1050 102/59   Pulse 04/01/25 1050 91   Resp 04/01/25 1050 16   Temp 04/01/25 1347 97 °F (36.1 °C)   Temp src 04/01/25 1347 Oral   SpO2 04/01/25 1050 92 %   O2 Device 04/01/25 1050 Nasal cannula       Current Vitals:   Vital Signs  BP: 133/72  Pulse: 83  Resp: 15  Temp: 97 °F (36.1 °C)  Temp src: Oral  MAP (mmHg): 90    Oxygen Therapy  SpO2: 100 %  O2 Device: Nasal cannula  O2 Flow Rate (L/min): 6 L/min        Physical Exam  Vitals and nursing note reviewed.   Constitutional:       General: He is not in acute distress.     Appearance: Normal appearance. He is well-developed. He is ill-appearing.      Comments: Cachectic   HENT:      Head: Normocephalic and atraumatic.   Cardiovascular:      Rate and Rhythm: Normal rate and regular rhythm.      Pulses: Normal pulses.      Heart sounds: Normal heart sounds.   Pulmonary:      Effort: Pulmonary effort is normal.      Breath sounds: Normal breath sounds. No  stridor.   Abdominal:      General: Bowel sounds are normal.      Palpations: Abdomen is soft.   Musculoskeletal:      Cervical back: Normal range of motion and neck supple.      Comments: Chronic amputations and contracture left upper extremity   Lymphadenopathy:      Cervical: No cervical adenopathy.   Skin:     General: Skin is warm and dry.   Neurological:      Mental Status: He is alert and oriented to person, place, and time. Mental status is at baseline.            ED Course     Labs Reviewed   COMP METABOLIC PANEL (14) - Abnormal; Notable for the following components:       Result Value    Glucose 310 (*)     Calculated Osmolality 309 (*)     Alkaline Phosphatase 138 (*)     All other components within normal limits   CBC WITH DIFFERENTIAL WITH PLATELET - Abnormal; Notable for the following components:    HGB 12.6 (*)     HCT 38.9 (*)     Eosinophil Absolute 1.05 (*)     All other components within normal limits   SCAN SLIDE   RAINBOW DRAW LAVENDER   RAINBOW DRAW LIGHT GREEN   RAINBOW DRAW BLUE   RAINBOW DRAW GOLD     EKG    Rate, intervals and axes as noted on EKG Report.  Rate: 84  Rhythm: Sinus Rhythm  Reading: No acute ST segment elevation                CT BRAIN OR HEAD (CPT=70450)    Result Date: 4/1/2025  PROCEDURE:  CT BRAIN OR HEAD (49206)  COMPARISON:  [facility] , CT, CT BRAIN OR HEAD (12480), 2/22/2025, 11:28 AM.  MR brain 2/25/2025  INDICATIONS:  From [place] with seizure like activity. Postictal/ unresponsive at the scene. A/OX4 at this time  TECHNIQUE:  Noncontrast CT scanning is performed through the brain. Dose reduction techniques were used. Dose information is transmitted to the ACR (American College of Radiology) NRDR (National Radiology Data Registry) which includes the Dose Index Registry.  PATIENT STATED HISTORY: (As transcribed by Technologist)  Patient with seizure like activity and unresponsive.    FINDINGS:  VENTRICLES/SULCI:  Ventricles and sulci are stable in size.  INTRACRANIAL:  No  new midline shift or mass effect is present.  Stable areas of hypoattenuation are present within the brain parenchyma best seen within the bilateral inferior frontal lobes and right parietal region.  These areas are similar to the prior  exams accounting for differences in technique.  No evidence of acute intracranial hemorrhage. SINUSES:           No sign of acute sinusitis.  MASTOIDS:          No sign of acute inflammation. SKULL:             Postsurgical changes of the calvarium are present. OTHER:             None.            CONCLUSION:  Stable appearance of the ventricles which remain prominent in size with respect to the cortical sulci.  Again this could represent normal pressure hydrocephalus in the appropriate clinical setting.  No new midline shift or mass effect is seen.  Areas of hypoattenuation and probable encephalomalacia are again noted.  These appear overall stable from the prior exam.  If there is persistent concern then consider follow-up imaging including MRI.    LOCATION:  [id number]   Dictated by (CST): Anatoly Crespo MD on 4/01/2025 at 1:30 PM     Finalized by (CST): Anatoly Crespo MD on 4/01/2025 at 1:34 PM       XR CHEST AP PORTABLE  (CPT=71045)    Result Date: 4/1/2025  PROCEDURE:  XR CHEST AP PORTABLE  (CPT=71045)  TECHNIQUE:  AP chest radiograph was obtained.  COMPARISON:  [facility] , XR, XR CHEST AP PORTABLE  (CPT=71045), 2/22/2025, 11:06 AM.  INDICATIONS:  From [place] with seizure like activity. Postictal/ unresponsive at the scene. A/OX4 at this time  PATIENT STATED HISTORY: (As transcribed by Technologist)  Patient offered no additional history at this time.    FINDINGS:  The heart is normal in size.  The lungs are clear.  There are no pleural effusions.  The bones are unremarkable.            CONCLUSION:  No acute disease.    LOCATION:  [facility]      Dictated by (CST): Timmy Seaman MD on 4/01/2025 at 1:10 PM     Finalized by (CST): Timmy Seaman MD on 4/01/2025 at 1:10 PM            [facility]      Patient  had initially refused all treatment.  However after a period time he did agree to get baseline blood work and to be certain that everything was okay since he did not recall the event.  He a CT scan of his head there reviewed there is no obvious bleed or midline shift like appreciated reviewed radiology reports and they did not note any interval change from his previous.  He had a chest x-ray also reviewed there is no obvious infiltrate or pleural effusion reviewed radiology port they felt that was no acute disease either.  Patient's blood work was otherwise unremarkable for him other than his elevated blood sugar he has a known history of being type I diabetic we did give him a dose of insulin and some fluids.  Patient was reevaluated he is resting comfortably.  Patient does not wish to have any further extensive testing and does feel back to his baseline.  He was subsequently discharged        Medical Decision Making      Disposition and Plan     Clinical Impression:  1. Seizure disorder (HCC)    2. Type 1 diabetes mellitus with hyperglycemia (HCC)         Disposition:  Discharge  4/1/2025  2:34 pm    Follow-up:  Julianne Zambrano MD  [address] [place] [address] [address] [place] [address] [phone]    Schedule an appointment as soon as possible for a visit            Medications Prescribed:  Current Discharge Medication List              Supplementary Documentation:

## 2025-04-01 NOTE — ED INITIAL ASSESSMENT (HPI)
From [place] with seizure like activity. Postictal/ unresponsive at the scene. A/OX4 at this time. Hx of cva.PT is A/OX4 and refusing treatment at time of triage.

## 2025-04-02 LAB
ATRIAL RATE: 84 BPM
P AXIS: 55 DEGREES
P-R INTERVAL: 142 MS
Q-T INTERVAL: 384 MS
QRS DURATION: 122 MS
QTC CALCULATION (BEZET): 453 MS
R AXIS: -29 DEGREES
T AXIS: 90 DEGREES
VENTRICULAR RATE: 84 BPM

## 2025-04-14 ENCOUNTER — APPOINTMENT (OUTPATIENT)
Dept: GENERAL RADIOLOGY | Facility: HOSPITAL | Age: 60
End: 2025-04-14
Attending: EMERGENCY MEDICINE
Payer: MEDICARE

## 2025-04-14 ENCOUNTER — APPOINTMENT (OUTPATIENT)
Dept: CT IMAGING | Facility: HOSPITAL | Age: 60
End: 2025-04-14
Attending: EMERGENCY MEDICINE
Payer: MEDICARE

## 2025-04-14 ENCOUNTER — HOSPITAL ENCOUNTER (EMERGENCY)
Facility: HOSPITAL | Age: 60
Discharge: HOME | End: 2025-04-14
Attending: EMERGENCY MEDICINE
Payer: MEDICARE

## 2025-04-14 VITALS
RESPIRATION RATE: 22 BRPM | SYSTOLIC BLOOD PRESSURE: 153 MMHG | TEMPERATURE: 99 F | DIASTOLIC BLOOD PRESSURE: 37 MMHG | HEIGHT: 72 IN | OXYGEN SATURATION: 98 % | BODY MASS INDEX: 27.77 KG/M2 | HEART RATE: 112 BPM | WEIGHT: 205 LBS

## 2025-04-14 DIAGNOSIS — K52.9 COLITIS: ICD-10-CM

## 2025-04-14 DIAGNOSIS — I69.319 CVA, OLD, COGNITIVE DEFICITS: ICD-10-CM

## 2025-04-14 DIAGNOSIS — N30.90 CYSTITIS: Primary | ICD-10-CM

## 2025-04-14 LAB
ACETONE: NEGATIVE
ALBUMIN SERPL-MCNC: 4.5 G/DL (ref 3.2–4.8)
ALBUMIN/GLOB SERPL: 1.6 {RATIO} (ref 1–2)
ALP LIVER SERPL-CCNC: 169 U/L (ref 45–117)
ALT SERPL-CCNC: 9 U/L (ref 10–49)
ANION GAP SERPL CALC-SCNC: 7 MMOL/L (ref 0–18)
AST SERPL-CCNC: 10 U/L (ref ?–34)
ATRIAL RATE: 115 BPM
BASOPHILS # BLD AUTO: 0.13 X10(3) UL (ref 0–0.2)
BASOPHILS NFR BLD AUTO: 0.7 %
BILIRUB SERPL-MCNC: 0.3 MG/DL (ref 0.3–1.2)
BILIRUB UR QL STRIP.AUTO: NEGATIVE
BUN BLD-MCNC: 16 MG/DL (ref 9–23)
CALCIUM BLD-MCNC: 10 MG/DL (ref 8.7–10.6)
CHLORIDE SERPL-SCNC: 96 MMOL/L (ref 98–112)
CLARITY UR REFRACT.AUTO: CLEAR
CO2 SERPL-SCNC: 26 MMOL/L (ref 21–32)
CREAT BLD-MCNC: 1.02 MG/DL (ref 0.7–1.3)
EGFRCR SERPLBLD CKD-EPI 2021: 85 ML/MIN/1.73M2 (ref 60–?)
EOSINOPHIL # BLD AUTO: 0.38 X10(3) UL (ref 0–0.7)
EOSINOPHIL NFR BLD AUTO: 2.1 %
ERYTHROCYTE [DISTWIDTH] IN BLOOD BY AUTOMATED COUNT: 14.5 %
GLOBULIN PLAS-MCNC: 2.9 G/DL (ref 2–3.5)
GLUCOSE BLD-MCNC: 266 MG/DL (ref 70–99)
GLUCOSE BLD-MCNC: 304 MG/DL (ref 70–99)
GLUCOSE BLD-MCNC: 328 MG/DL (ref 70–99)
GLUCOSE UR STRIP.AUTO-MCNC: >1000 MG/DL
HCT VFR BLD AUTO: 36.2 % (ref 39–53)
HGB BLD-MCNC: 12.2 G/DL (ref 13–17.5)
HYALINE CASTS #/AREA URNS AUTO: PRESENT /LPF
IMM GRANULOCYTES # BLD AUTO: 0.07 X10(3) UL (ref 0–1)
IMM GRANULOCYTES NFR BLD: 0.4 %
KETONES UR STRIP.AUTO-MCNC: NEGATIVE MG/DL
LACTATE SERPL-SCNC: 1.9 MMOL/L (ref 0.5–2)
LEUKOCYTE ESTERASE UR QL STRIP.AUTO: 25
LIPASE SERPL-CCNC: 50 U/L (ref 12–53)
LYMPHOCYTES # BLD AUTO: 1.12 X10(3) UL (ref 1–4)
LYMPHOCYTES NFR BLD AUTO: 6.3 %
MCH RBC QN AUTO: 27.7 PG (ref 26–34)
MCHC RBC AUTO-ENTMCNC: 33.7 G/DL (ref 31–37)
MCV RBC AUTO: 82.3 FL (ref 80–100)
MONOCYTES # BLD AUTO: 0.78 X10(3) UL (ref 0.1–1)
MONOCYTES NFR BLD AUTO: 4.4 %
NEUTROPHILS # BLD AUTO: 15.22 X10 (3) UL (ref 1.5–7.7)
NEUTROPHILS # BLD AUTO: 15.22 X10(3) UL (ref 1.5–7.7)
NEUTROPHILS NFR BLD AUTO: 86.1 %
NITRITE UR QL STRIP.AUTO: NEGATIVE
OSMOLALITY SERPL CALC.SUM OF ELEC: 282 MOSM/KG (ref 275–295)
P AXIS: 50 DEGREES
P-R INTERVAL: 134 MS
PH UR STRIP.AUTO: 5 [PH] (ref 5–8)
PLATELET # BLD AUTO: 391 10(3)UL (ref 150–450)
POTASSIUM SERPL-SCNC: 5.2 MMOL/L (ref 3.5–5.1)
PROT SERPL-MCNC: 7.4 G/DL (ref 5.7–8.2)
PROT UR STRIP.AUTO-MCNC: 30 MG/DL
Q-T INTERVAL: 320 MS
QRS DURATION: 86 MS
QTC CALCULATION (BEZET): 442 MS
R AXIS: -35 DEGREES
RBC # BLD AUTO: 4.4 X10(6)UL (ref 4.3–5.7)
RBC UR QL AUTO: NEGATIVE
SODIUM SERPL-SCNC: 129 MMOL/L (ref 136–145)
SP GR UR STRIP.AUTO: 1.01 (ref 1–1.03)
T AXIS: 94 DEGREES
UROBILINOGEN UR STRIP.AUTO-MCNC: NORMAL MG/DL
VENTRICULAR RATE: 115 BPM
WBC # BLD AUTO: 17.7 X10(3) UL (ref 4–11)

## 2025-04-14 PROCEDURE — 96374 THER/PROPH/DIAG INJ IV PUSH: CPT

## 2025-04-14 PROCEDURE — 87040 BLOOD CULTURE FOR BACTERIA: CPT | Performed by: EMERGENCY MEDICINE

## 2025-04-14 PROCEDURE — 82962 GLUCOSE BLOOD TEST: CPT

## 2025-04-14 PROCEDURE — 83690 ASSAY OF LIPASE: CPT | Performed by: EMERGENCY MEDICINE

## 2025-04-14 PROCEDURE — 99285 EMERGENCY DEPT VISIT HI MDM: CPT

## 2025-04-14 PROCEDURE — 36415 COLL VENOUS BLD VENIPUNCTURE: CPT

## 2025-04-14 PROCEDURE — 87086 URINE CULTURE/COLONY COUNT: CPT | Performed by: EMERGENCY MEDICINE

## 2025-04-14 PROCEDURE — 85025 COMPLETE CBC W/AUTO DIFF WBC: CPT | Performed by: EMERGENCY MEDICINE

## 2025-04-14 PROCEDURE — 71045 X-RAY EXAM CHEST 1 VIEW: CPT | Performed by: EMERGENCY MEDICINE

## 2025-04-14 PROCEDURE — 93005 ELECTROCARDIOGRAM TRACING: CPT

## 2025-04-14 PROCEDURE — 82009 KETONE BODYS QUAL: CPT | Performed by: EMERGENCY MEDICINE

## 2025-04-14 PROCEDURE — 74177 CT ABD & PELVIS W/CONTRAST: CPT | Performed by: EMERGENCY MEDICINE

## 2025-04-14 PROCEDURE — 93010 ELECTROCARDIOGRAM REPORT: CPT

## 2025-04-14 PROCEDURE — 80053 COMPREHEN METABOLIC PANEL: CPT | Performed by: EMERGENCY MEDICINE

## 2025-04-14 PROCEDURE — 81001 URINALYSIS AUTO W/SCOPE: CPT | Performed by: EMERGENCY MEDICINE

## 2025-04-14 PROCEDURE — 87186 SC STD MICRODIL/AGAR DIL: CPT | Performed by: EMERGENCY MEDICINE

## 2025-04-14 PROCEDURE — 87077 CULTURE AEROBIC IDENTIFY: CPT | Performed by: EMERGENCY MEDICINE

## 2025-04-14 PROCEDURE — 83605 ASSAY OF LACTIC ACID: CPT | Performed by: EMERGENCY MEDICINE

## 2025-04-14 PROCEDURE — 96361 HYDRATE IV INFUSION ADD-ON: CPT

## 2025-04-14 RX ORDER — INSULIN ASPART 100 [IU]/ML
0.15 INJECTION, SOLUTION INTRAVENOUS; SUBCUTANEOUS ONCE
Status: DISCONTINUED | OUTPATIENT
Start: 2025-04-14 | End: 2025-04-14

## 2025-04-14 RX ORDER — CEPHALEXIN 500 MG/1
500 CAPSULE ORAL 4 TIMES DAILY
Qty: 40 CAPSULE | Refills: 0 | Status: SHIPPED | OUTPATIENT
Start: 2025-04-14 | End: 2025-04-24

## 2025-04-14 RX ORDER — NITROFURANTOIN 25; 75 MG/1; MG/1
100 CAPSULE ORAL ONCE
Status: DISCONTINUED | OUTPATIENT
Start: 2025-04-14 | End: 2025-04-14

## 2025-04-14 NOTE — ED INITIAL ASSESSMENT (HPI)
C/O weakness past few days; been refusing medication and facility called EMS; from [facility]; pt TIID w/ BS on [address]; hx stroke w/residual left sided; no new CVA s/s; pt refused IV for EMS and been refusing insulin  DNR on file w/paperwork; EMS unaware of family notification

## 2025-04-14 NOTE — ED PROVIDER NOTES
Patient Seen in: [facility] Emergency Department      History     Chief Complaint   Patient presents with    Fatigue    Hyperglycemia      REFUSING INSULIN; TIID     Stated Complaint: WEAKNESS; HYPERGLYCEMIA; POSITIVE CDIFF    Subjective:     HPI    59-year-old male from [facility] with history of right parietal CVA, seizure disorder, cardiomyopathy/CHF/hypertension, CKD, insulin-dependent diabetes who reports experiencing worsening abdominal pain over the past two weeks. He also reports experiencing vomiting.  Patient very poor historian.  He said the pain has been going on a couple weeks and cannot localize it to a quadrant.    Objective:   Past Medical History:    Anemia    Cancer (HCC)    Cardiomyopathy (HCC)    Congestive heart disease (HCC)    Esophageal reflux    Essential hypertension    Hyperlipidemia    Neuropathy    Renal disorder    Type I (juvenile type) diabetes mellitus without mention of complication, not stated as uncontrolled              Past Surgical History:   Procedure Laterality Date    Below knee leg amputation Right     Cataract      Other surgical history      tendon 2006    Other surgical history      (L) hallux amputation 2006    Other surgical history                  Social History     Socioeconomic History    Marital status: Single   Tobacco Use    Smoking status: Never   Vaping Use    Vaping status: Never Used   Substance and Sexual Activity    Alcohol use: Yes     Comment: social    Drug use: Not Currently     Social Drivers of Health     Food Insecurity: No Food Insecurity (2/22/2025)    NCSS - Food Insecurity     Worried About Running Out of Food in the Last Year: No     Ran Out of Food in the Last Year: No   Transportation Needs: No Transportation Needs (2/22/2025)    NCSS - Transportation     Lack of Transportation: No   Housing Stability: Not At Risk (2/22/2025)    NCSS - Housing/Utilities     Has Housing: Yes     Worried About Losing Housing: No     Unable  to Get Utilities: No              Review of Systems    Positive for stated complaint: WEAKNESS; HYPERGLYCEMIA; POSITIVE CDIFF  Other systems are as noted in HPI.  Constitutional and vital signs reviewed.      All other systems reviewed and negative except as noted above.    Physical Exam     ED Triage Vitals [04/14/25 1312]   /82   Pulse 114   Resp 18   Temp 99.4 °F (37.4 °C)   Temp src Oral   SpO2 90 %   O2 Device None (Room air)       Current:/37   Pulse 112   Temp 99.4 °F (37.4 °C) (Oral)   Resp 22   Ht 182.9 cm (6')   Wt 93 kg   SpO2 98%   BMI 27.81 kg/m²     General:  Vitals as listed.  No acute distress   HEENT: Sclerae anicteric.  Conjunctivae show no pallor.  Oropharynx clear, mucous membranes dry  Lungs: Decreased air exchange  Abdomen: Moderate diffuse tenderness.  No abdominal masses.  No peritoneal signs   Extremities: no edema, normal peripheral pulses   Neuro: Alert, slow to respond to questions and contracted due to prior stroke    Vitals:    04/14/25 1500 04/14/25 1515 04/14/25 1700 04/14/25 1730   BP: 110/64 (!) 120/93 118/87 153/37   Pulse: 109 88 114 112   Resp: 25 25 22    Temp:       TempSrc:       SpO2: 100% 97% 98%    Weight:       Height:           ED Course     Labs Reviewed   COMP METABOLIC PANEL (14) - Abnormal; Notable for the following components:       Result Value    Glucose 328 (*)     Sodium 129 (*)     Potassium 5.2 (*)     Chloride 96 (*)     ALT 9 (*)     Alkaline Phosphatase 169 (*)     All other components within normal limits   CBC WITH DIFFERENTIAL WITH PLATELET - Abnormal; Notable for the following components:    WBC 17.7 (*)     HGB 12.2 (*)     HCT 36.2 (*)     Neutrophil Absolute Prelim 15.22 (*)     Neutrophil Absolute 15.22 (*)     All other components within normal limits   URINALYSIS WITH CULTURE REFLEX - Abnormal; Notable for the following components:    Glucose Urine >1000 (*)     Protein Urine 30 (*)     Leukocyte Esterase Urine 25 (*)     WBC  Urine 11-20 (*)     Bacteria Urine Rare (*)     Hyaline Casts Present (*)     All other components within normal limits   POCT GLUCOSE - Abnormal; Notable for the following components:    POC Glucose 304 (*)     All other components within normal limits   POCT GLUCOSE - Abnormal; Notable for the following components:    POC Glucose 266 (*)     All other components within normal limits   LIPASE - Normal   ACETONE - Normal   LACTIC ACID, PLASMA - Normal   RAINBOW DRAW LAVENDER   RAINBOW DRAW LIGHT GREEN   RAINBOW DRAW BLUE   RAINBOW DRAW GOLD   BLOOD CULTURE   BLOOD CULTURE   URINE CULTURE, ROUTINE     CT ABDOMEN+PELVIS(CONTRAST ONLY)(CPT=74177)  Result Date: 4/14/2025  CONCLUSION:   1. Findings of mild distal colon colitis.  No bowel obstruction, abscess, or free air.  2. Small gallstone without evidence of biliary obstruction  3. Indeterminate lesions within the bilateral lower pole of the kidney measuring 12 x 11 mm with 80 Hounsfield units on the left and 74 Hounsfield units and 9 x 9 mm in the right midpole.  These could represent a complex cysts versus enhancing masses and  further characterization be recommended with a renal mass protocol CT.  4. Fat containing umbilical hernia.   LOCATION:  [facility]   Dictated by (CST): Timmy Seaman MD on 4/14/2025 at 5:33 PM     Finalized by (CST): Timmy Seaman MD on 4/14/2025 at 5:39 PM       XR CHEST AP PORTABLE  (CPT=71045)  Result Date: 4/14/2025  CONCLUSION:  No acute disease.    LOCATION:  [facility]      Dictated by (CST): Timmy Seaman MD on 4/14/2025 at 5:08 PM     Finalized by (CST): Timmy Seaman MD on 4/14/2025 at 5:10 PM     I independently read the radiographs and no infiltrate on chest x-ray  EKG    Rate, intervals and axes as noted on EKG Report.  Rate: 115  Rhythm: Sinus Rhythm  Reading: No acute ST-T changes.  There are inferior Q waves and biphasic T waves in the lateral leads that are both pre-existing           ED COURSE and MDM     Sources of the medical  history included the patient and nursing home records    Differential diagnosis before testing included cystitis versus severe sepsis, a medical condition that poses a threat to life.    I reviewed prior external notes including discharge summary from his 2/22/2025 admission for seizure disorder related to right parietal CVA and meningiomas status post resection    Hydrated with normal saline.  Given insulin according to protocol.    Corrected sodium is 132    I have discussed with the patient the results of testing, differential diagnosis, and treatment plan. They expressed clear understanding of these instructions and agrees to the plan provided.    Disposition and Plan     Clinical Impression:  1. Cystitis    2. CVA, old, cognitive deficits    3. Colitis         Disposition:  Discharge  4/14/2025  5:38 pm    Follow-up:  Julianne Zambrano MD  [address] [place] [address] [address] [place] [address] [phone]    Schedule an appointment as soon as possible for a visit in 1 week(s)          Medications Prescribed:  Discharge Medication List as of 4/14/2025  6:39 PM        START taking these medications    Details   cephALEXin 500 MG Oral Cap Take 1 capsule (500 mg total) by mouth 4 (four) times daily for 10 days., Print, Disp-40 capsule, R-0

## 2025-04-14 NOTE — ED QUICK NOTES
N2N patient report given to DAVID Schaefer. Plan of care reviewed. Pt most likely be admitted; all blood work done and waiting results; needs new order of insulin; bolus going in now

## (undated) NOTE — LETTER
CONTINUOUS GLUCOSE MONITOR AND INSULIN PUMP AGREEMENT     CONTINUOUS GLUCOSE MONITOR (CGM) (If not signed, not applicable)     CGMs are not currently approved by the FDA for use in the hospital. If you choose to continue to wear your CGM in the hospital, we ask that you agree to the following:     ?   Allow finger stick blood glucose tests to be performed using hospital glucose monitor.   ?   Insulin dosing and hypoglycemia (low blood sugar) treatment will be provided based on hospital glucose monitor        results.   ?   Remove your CGM on or before the date it is due to be removed or as ordered by physician.   ?   Remove your CGM prior to any scheduled surgery and as instructed for procedures.   ?   Remove your CGM prior to Magnetic Resonance Imaging (MRI), Computerized Tomography (CT) or x-ray as it can      damage your CGM.   ?   Inform staff of any skin irritation, redness, or other problems with your skin or CGM.     __________________________________________________________ ________________________________   Patient/Guardian Signature                                                                                  Date/Time     __________________________________________________________   Print Guardian Name     __________________________________________________________ ________________________________   Staff/RN Signature                                                                                                 Date/Time      INSULIN PUMP (If not signed, not applicable)     For your safety and best possible care, we ask that you agree to the following. If you cannot agree to the following requirements of this agreement, or, if it is not possible for you to meet these requirements, we will provide and administer insulin based on your provider’s orders.     ?   Communicate your site changes, carbohydrate intake, and all boluses to your nurse.   ?   Provide your own insulin and pump supplies.   ?   Change  your infusion site at least every 3 days, and as needed for skin irritation or two consecutive glucose readings       greater than 240 mg/dL.   ?   Make no changes to your basal rates, carb ratios, or correction factor unless directed to do so by the physician        managing your insulin pump.   ?   Notify your nurse before you eat so that your blood glucose level can be obtained with hospital glucose monitor.   ?   Report symptoms of low blood sugar to your nurse immediately.   ?   Notify your nurse of any problems with your pump that you cannot correct.     I understand that my insulin pump may be discontinued and a different method of insulin delivery will be provided for any of the following reasons:     ?   Inability to safely perform diabetes self-management activities because of the condition for which I was hospitalized,        or side effects of my treatment.   ? Inability or inconsistency in performing the diabetes self-management activities described above.     __________________________________________________________ ________________________________   Patient/Guardian Signature                                                                                 Date/Time     __________________________________________________________   Print Guardian Name     __________________________________________________________ ________________________________   Staff/RN Signature                                                                                                 Date/Time       Patient Name: Janes Heart     : 1965                 Printed: 2025     Medical Record #: [id number]                                            Page         INSULIN PUMP NURSING CHECKLIST   ON ADMISSION     ? Document insulin pump in Medical Devices/Implants section of Epic (even if patient not wearing pump in hospital).   ? Use link in Insulin Pump BPA to print the CGM/Insulin Pump Agreement and  Checklists.    ? Apply patient label to Agreement and have patient read and sign it. Place on chart.   ? Add Insulin Pump LDA to the IV Assessment flowsheet.   ? Endocrinology to be consulted (except at [facility]):    ? Rafi: Attending to initiate consult.    ?  Laith: Open endocrinologist order through BPA (no cosign required). Page on-call endocrinologist          through [employer].    ?  Chetan Nicholas: Notify attending of insulin pump.     EVERY SHIFT     ? Document pump site assessment and any site changes. ADMISSION   ? Chart ALL insulin bolus doses in MAR as “Self-administered via pump”.     PROTOCOL REMINDERS     ? Insulin Pump Focused order set is to be used for all patients using an insulin pump.   ? Patient to provide own pump supplies and insulin.   ? Point of care glucose to be performed using hospital glucometer, even if wearing a continuous glucose           monitor.     NOTIFY ENDOCRINOLOGY OR MFM  FOR: (at [facility], notify attending)     Temporary discontinuation of pump infusion for more than one hour   Surgery   Change in patient condition, mental status, or compliance that prohibits ability to self-manage the insulin pump   Patient reports insulin pump not operating properly   Patient does not have appropriate insulin or supplies for insulin pump   Risk for suicide   Blood glucose outside ranges indicated in insulin pump orders       DO NOT REMOVE INSULIN PUMP WITHOUT ORDER FROM ENDOCRINOLOGIST/MGM -  or attending at [facility]     NOT PART OF PERMANENT CHART     Patient Name: Janes Heart     : 1965                 Printed: 2025     Medical Record #: [id number]                                            Page  FOR: (at [facility],     CONTINUOUS GLUCOSE MONITOR (CGM) NURSING CHECKLIST     A Continuous Glucose Monitor (CGM), also referred to as a “sensor”, is a small device that takes frequent blood glucose (BG) readings, approximately every 5 minutes.   BG is measured in  interstitial fluid by a tiny electrode under the skin.   The CGM can be worn up to 10-14 days, depending on the model.   Often used along with an insulin pump, but may also be a stand-alone device.      ON ADMISSION     ?   Document CGM in Medical Devices/Implants in Admission Navigator  ?   When BPA fires, print the GM/Insulin Pump Agreement, place patient label, have patient read and sign and place on        chart.   ?   Add CGM LDA to the IV Assessment flowsheet   ?   Inform patient that HOSPITAL GLUCOSE MONITOR will be used for BG testing   ?   CGM not approved by FDA for inpatient use    ?   Frequent quality tests are performed on hospital glucose monitor                ?   Results of hospital glucose monitor cross over into electronic medical record   ?   Some medications interfere with CGM models including acetaminophen, aspirin, and Vitamin C    IMPORTANT INFORMATION     ? Point of care glucose to be performed using HOSPITAL glucose monitor.   ? Do not treat with insulin or treat hypoglycemia based on CGM values.   ? Patient to remove CGM before MRI, CT, x-ray, or any procedure that uses radiation such as ERCP,         fluoroscopic exam, IV Pyelogram, mammogram, cardiac cath, etc.   ? Exposure to above imaging may damage the CGM causing inaccurate BG readings, or prevent the         device from alarming.   ? Patient to remove CGM prior to surgery as it is often unknown if x-rays or other imaging will be used.   ? If CGM is removed or falls off, give to patient or family, or store according to hospital policy. Not all parts         are disposable and replacement can be very expensive.   ? Every shift, document CGM site assessment   ? Chart removal of CGM     NOTIFY ATTENDING/ENDOCRINOLOGIST IF:     ? Patient refuses to allow finger stick tests with hospital glucose monitor.   ? Any problems or irritation at CGM site.      NOT PART OF PERMANENT CHART  Patient Name: Janes Heart     : 1965                  Printed: February 22, 2025     Medical Record #: [id number]                                            Page 1/1 24/7